# Patient Record
Sex: FEMALE | Race: WHITE | Employment: FULL TIME | URBAN - METROPOLITAN AREA
[De-identification: names, ages, dates, MRNs, and addresses within clinical notes are randomized per-mention and may not be internally consistent; named-entity substitution may affect disease eponyms.]

---

## 2022-09-01 ENCOUNTER — TELEPHONE (OUTPATIENT)
Dept: OBGYN CLINIC | Age: 37
End: 2022-09-01

## 2022-09-01 NOTE — TELEPHONE ENCOUNTER
Pt called she is a NP confirmation of pregnancy she has not been seen yet but she is having tooth pain and needs a tooth pulled but they will not see her until she gets a note and she is in a lot of pain she is wondering what you recommend please advise

## 2022-09-01 NOTE — TELEPHONE ENCOUNTER
Can write note and have either Dr. Johny Reyes or Dr. Josh Hoffman sign on Friday.     Thanks,    Phan Nunez, [address] [facility]   9/1/2022, 5:18 PM

## 2022-09-30 ENCOUNTER — OFFICE VISIT (OUTPATIENT)
Dept: OBGYN CLINIC | Age: 37
End: 2022-09-30
Payer: COMMERCIAL

## 2022-09-30 ENCOUNTER — HOSPITAL ENCOUNTER (OUTPATIENT)
Age: 37
Setting detail: SPECIMEN
Discharge: HOME | End: 2022-09-30

## 2022-09-30 VITALS
HEIGHT: 68 IN | WEIGHT: 160.6 LBS | SYSTOLIC BLOOD PRESSURE: 118 MMHG | BODY MASS INDEX: 24.34 KG/M2 | HEART RATE: 87 BPM | DIASTOLIC BLOOD PRESSURE: 2 MMHG

## 2022-09-30 DIAGNOSIS — N89.8 VAGINAL PRURITUS: ICD-10-CM

## 2022-09-30 DIAGNOSIS — B37.9 YEAST INFECTION: ICD-10-CM

## 2022-09-30 DIAGNOSIS — Z32.01 POSITIVE PREGNANCY TEST: Primary | ICD-10-CM

## 2022-09-30 DIAGNOSIS — O21.9 NAUSEA AND VOMITING DURING PREGNANCY: ICD-10-CM

## 2022-09-30 DIAGNOSIS — Z3A.09 9 WEEKS GESTATION OF PREGNANCY: ICD-10-CM

## 2022-09-30 DIAGNOSIS — Z32.01 POSITIVE PREGNANCY TEST: ICD-10-CM

## 2022-09-30 PROBLEM — K42.9 UMBILICAL HERNIA WITHOUT OBSTRUCTION AND WITHOUT GANGRENE: Status: ACTIVE | Noted: 2022-09-30

## 2022-09-30 PROBLEM — Z82.79 FAMILY HISTORY OF SPINA BIFIDA: Status: ACTIVE | Noted: 2022-09-30

## 2022-09-30 PROCEDURE — 99212 OFFICE O/P EST SF 10 MIN: CPT | Performed by: STUDENT IN AN ORGANIZED HEALTH CARE EDUCATION/TRAINING PROGRAM

## 2022-09-30 RX ORDER — AMOXICILLIN 500 MG/1
CAPSULE ORAL
COMMUNITY
Start: 2022-09-22 | End: 2022-10-28 | Stop reason: CLARIF

## 2022-09-30 RX ORDER — PYRIDOXINE HCL (VITAMIN B6) 25 MG
25 TABLET ORAL 3 TIMES DAILY
Qty: 60 TABLET | Refills: 3 | Status: SHIPPED | OUTPATIENT
Start: 2022-09-30 | End: 2022-11-02

## 2022-09-30 NOTE — PROGRESS NOTES
[facility]  Initial Prenatal Visit    CC: Initial Prenatal Visit    HPI:   Storm Mcgraw is a 30 y.o. female [id number] at 9w1d  She is being seen today for her first obstetrical visit. Her LMP was 22. She had her dating US this morning that confirmed cardiac activity and due date was confirmed to be 23. She is taking her prenatal vitamins. Has every day nausea. States it is hard for her to keep down food. She is not taking any meds for this nausea. States she is having vaginal pruritis that started a few days ago. Denies any vaginal bleeding but has an increase in discharge. She recently moved from [place] to [facility] in 2019. Denies any HTN or diabetes in any of her previous pregnancies. Reports she only tore with her first delivery. Denies any should dystocia hx or PPH. None of her kids had to go to the NICU. Her last baby she delivered at home [place] the [place]. States she has an umbilical hernia that always gets worse through out the pregnancy. She wants to have it surgically repaired when she is done having kids. Denies any pain related to the hernia today. G6 and G7 pregnancy are with the same FOB. Mother's ethnicity:   Father's ethnicity:   Family History:    - Neural tube defects: Yes: half sister with spina bifida - has had 2 surgeries and can walk    - Congenital birth defects (congenital heart defects, polydactyly, cleft lip/palate): No   - Intellectual disability: No   - Genetic disorders/chromosomal abnormalities: No   - Diabetes mellitus in first degree relatives: Yes: paternal grandfather     Genetic screening was discussed and patient desires NIPT but does not want to know gender .     OB History:  OB History    Para Term  AB Living   7 4 0 0 2 4   SAB IAB Ectopic Molar Multiple Live Births   2 0 0 0 0 4      # Outcome Date GA Lbr Kobi/2nd Weight Sex Delivery Anes PTL Lv   7 Current            6 Para 2019    F Vag-Spont   XIAO   5 SAB 2017           4 SAB 2017           3 Para 2015    M Vag-Spont   XIAO   2 Para 2009    M Vag-Spont   XIAO   1 Para 2007    F Vag-Spont   XIAO       Past Medical History:  History reviewed. No pertinent past medical history. Past Surgical History:  Past Surgical History:   Procedure Laterality Date    SHOULDER SURGERY Right 2017    dislocation        Medications:  Current Outpatient Medications on File Prior to Visit   Medication Sig Dispense Refill    amoxicillin (AMOXIL) 500 MG capsule       Prenatal Vit-Fe Fumarate-FA (PRENATAL PO) Take by mouth       No current facility-administered medications on file prior to visit. Allergies:   Allergies as of 09/30/2022    (No Known Allergies)       Social History:  Social History     Socioeconomic History    Marital status:      Spouse name: Not on file    Number of children: Not on file    Years of education: Not on file    Highest education level: Not on file   Occupational History    Not on file   Tobacco Use    Smoking status: Never    Smokeless tobacco: Never   Vaping Use    Vaping Use: Never used   Substance and Sexual Activity    Alcohol use: Not Currently    Drug use: Never    Sexual activity: Yes   Other Topics Concern    Not on file   Social History Narrative    Not on file     Social Determinants of Health     Financial Resource Strain: Not on file   Food Insecurity: Not on file   Transportation Needs: Not on file   Physical Activity: Not on file   Stress: Not on file   Social Connections: Not on file   Intimate Partner Violence: Not on file   Housing Stability: Not on file       Family History:  Family History   Problem Relation Age of Onset    No Known Problems Mother     No Known Problems Father     No Known Problems Maternal Grandmother     No Known Problems Maternal Grandfather     Breast Cancer Paternal Grandmother     Diabetes Paternal Grandfather        Vitals:  BP: (!) 118/2  Weight: 160 lb 9.6 oz (72.8 kg)  Heart Rate: 87     Physical Exam: Completed, See Epic Navigator      Assessment & Plan:  Miller Poole is a 30 y.o. female [id number] at 9w1d Initial Obstetrical Visit   - VSS    - Dating confirmed today    - Discussed with patient that this office does not participate in  water births but informed her of the labor tub in room 708. Patient was understanding and agreeable that she would deliver in the bed    - Discussed practicing diaphragmatic breathing and pelvic floor exercises to start incorporating into her daily routine to hopefully prevent worsening of her diastasis recti and umbilical hernia    - Prenatal labs ordered   - Prenatal vitamins QD   - VitB6 TID Rx sent for nausea     - Problem list reviewed and updated   - Desires NIPT for genetic testing but does not want to know the gender    - Patient needs referral to [facility] for anatomy scan    - Vaginitis and GC/C swabs collected     - Unsure of her last pap, collected today    - Follow up in 1 week for ACOG with Amy Robison    - Follow up in 4 weeks for next prenatal appointment     Upon completion of the visit all questions were answered and the patients follow-up and testing schedule were reviewed.      Patient Active Problem List    Diagnosis Date Noted    Family history of spina bifida 09/30/2022     Priority: Medium     Half sister- had surgery at birth and at 9 yrs old, is ambulatory and has children       Umbilical hernia 08/66/6892     Priority: [address], [address] [facility]   9/30/2022, 4:34 PM

## 2022-10-01 LAB
AMPHETAMINE SCREEN URINE: NEGATIVE
BACTERIA: ABNORMAL
BARBITURATE SCREEN URINE: NEGATIVE
BENZODIAZEPINE SCREEN, URINE: NEGATIVE
BILIRUBIN URINE: NEGATIVE
CANDIDA SPECIES, DNA PROBE: POSITIVE
CANNABINOID SCREEN URINE: NEGATIVE
COCAINE METABOLITE, URINE: NEGATIVE
COLOR: ABNORMAL
CRYSTALS, UA: ABNORMAL /HPF
CRYSTALS, UA: ABNORMAL /HPF
CULTURE: NORMAL
EPITHELIAL CELLS UA: ABNORMAL /HPF (ref 0–5)
FENTANYL URINE: NEGATIVE
GARDNERELLA VAGINALIS, DNA PROBE: NEGATIVE
GLUCOSE URINE: NEGATIVE
KETONES, URINE: ABNORMAL
LEUKOCYTE ESTERASE, URINE: ABNORMAL
METHADONE SCREEN, URINE: NEGATIVE
MUCUS: ABNORMAL
NITRITE, URINE: NEGATIVE
OPIATES, URINE: NEGATIVE
OXYCODONE SCREEN URINE: NEGATIVE
PH UA: 7 (ref 5–8)
PHENCYCLIDINE, URINE: NEGATIVE
PROTEIN UA: ABNORMAL
RBC UA: ABNORMAL /HPF (ref 0–2)
SOURCE: ABNORMAL
SPECIFIC GRAVITY UA: 1.03 (ref 1–1.03)
SPECIMEN DESCRIPTION: NORMAL
TEST INFORMATION: NORMAL
TRICHOMONAS VAGINALIS DNA: NEGATIVE
TURBIDITY: CLEAR
URINE HGB: NEGATIVE
UROBILINOGEN, URINE: NORMAL
WBC UA: ABNORMAL /HPF (ref 0–5)

## 2022-10-03 LAB
C TRACH DNA GENITAL QL NAA+PROBE: NEGATIVE
N. GONORRHOEAE DNA: NEGATIVE
SPECIMEN DESCRIPTION: NORMAL

## 2022-10-05 ENCOUNTER — TELEPHONE (OUTPATIENT)
Dept: OBGYN CLINIC | Age: 37
End: 2022-10-05

## 2022-10-05 NOTE — TELEPHONE ENCOUNTER
New OB LMOR she cancelled her ACOG visit with nurse. She needs to call back to R/S ACOG nurse before her next OB visit.

## 2022-10-14 LAB — CYTOLOGY REPORT: NORMAL

## 2022-10-28 ENCOUNTER — HOSPITAL ENCOUNTER (OUTPATIENT)
Age: 37
Setting detail: SPECIMEN
Discharge: HOME | End: 2022-10-28

## 2022-10-28 ENCOUNTER — ROUTINE PRENATAL (OUTPATIENT)
Dept: OBGYN CLINIC | Age: 37
End: 2022-10-28
Payer: COMMERCIAL

## 2022-10-28 VITALS
HEART RATE: 83 BPM | BODY MASS INDEX: 24.84 KG/M2 | DIASTOLIC BLOOD PRESSURE: 84 MMHG | SYSTOLIC BLOOD PRESSURE: 112 MMHG | WEIGHT: 163.4 LBS

## 2022-10-28 DIAGNOSIS — Z3A.13 13 WEEKS GESTATION OF PREGNANCY: ICD-10-CM

## 2022-10-28 DIAGNOSIS — Z34.92 NORMAL PREGNANCY IN SECOND TRIMESTER: ICD-10-CM

## 2022-10-28 DIAGNOSIS — Z82.79 FAMILY HISTORY OF SPINA BIFIDA: ICD-10-CM

## 2022-10-28 DIAGNOSIS — Z32.01 POSITIVE PREGNANCY TEST: ICD-10-CM

## 2022-10-28 DIAGNOSIS — Z3A.13 13 WEEKS GESTATION OF PREGNANCY: Primary | ICD-10-CM

## 2022-10-28 LAB
ABO/RH: NORMAL
ABSOLUTE EOS #: 0.25 K/UL (ref 0–0.44)
ABSOLUTE IMMATURE GRANULOCYTE: 0.06 K/UL (ref 0–0.3)
ABSOLUTE LYMPH #: 1 K/UL (ref 1.1–3.7)
ABSOLUTE MONO #: 0.37 K/UL (ref 0.1–1.2)
ANTIBODY SCREEN: NEGATIVE
BASOPHILS # BLD: 0 % (ref 0–2)
BASOPHILS ABSOLUTE: 0.03 K/UL (ref 0–0.2)
EOSINOPHILS RELATIVE PERCENT: 3 % (ref 1–4)
HCT VFR BLD CALC: 36 % (ref 36.3–47.1)
HEMOGLOBIN: 12.1 G/DL (ref 11.9–15.1)
HEPATITIS B SURFACE ANTIGEN: NONREACTIVE
HIV AG/AB: NONREACTIVE
IMMATURE GRANULOCYTES: 1 %
LYMPHOCYTES # BLD: 13 % (ref 24–43)
MCH RBC QN AUTO: 33.2 PG (ref 25.2–33.5)
MCHC RBC AUTO-ENTMCNC: 33.6 G/DL (ref 28.4–34.8)
MCV RBC AUTO: 98.6 FL (ref 82.6–102.9)
MONOCYTES # BLD: 5 % (ref 3–12)
NRBC AUTOMATED: 0 PER 100 WBC
PDW BLD-RTO: 12.3 % (ref 11.8–14.4)
PLATELET # BLD: 269 K/UL (ref 138–453)
PMV BLD AUTO: 10.7 FL (ref 8.1–13.5)
RBC # BLD: 3.65 M/UL (ref 3.95–5.11)
RUBV IGG SER QL: 72.2 IU/ML
SEG NEUTROPHILS: 78 % (ref 36–65)
SEGMENTED NEUTROPHILS ABSOLUTE COUNT: 6.05 K/UL (ref 1.5–8.1)
T. PALLIDUM, IGG: NONREACTIVE
WBC # BLD: 7.8 K/UL (ref 3.5–11.3)

## 2022-10-28 PROCEDURE — 1036F TOBACCO NON-USER: CPT | Performed by: STUDENT IN AN ORGANIZED HEALTH CARE EDUCATION/TRAINING PROGRAM

## 2022-10-28 PROCEDURE — G8420 CALC BMI NORM PARAMETERS: HCPCS | Performed by: STUDENT IN AN ORGANIZED HEALTH CARE EDUCATION/TRAINING PROGRAM

## 2022-10-28 PROCEDURE — G8427 DOCREV CUR MEDS BY ELIG CLIN: HCPCS | Performed by: STUDENT IN AN ORGANIZED HEALTH CARE EDUCATION/TRAINING PROGRAM

## 2022-10-28 PROCEDURE — 99213 OFFICE O/P EST LOW 20 MIN: CPT | Performed by: STUDENT IN AN ORGANIZED HEALTH CARE EDUCATION/TRAINING PROGRAM

## 2022-10-28 PROCEDURE — G8484 FLU IMMUNIZE NO ADMIN: HCPCS | Performed by: STUDENT IN AN ORGANIZED HEALTH CARE EDUCATION/TRAINING PROGRAM

## 2022-10-28 NOTE — PROGRESS NOTES
Prenatal Visit    Godwin Ho is a 30 y.o. female [id number] at 13w1d IUP    Subjective: The patient was seen and evaluated. She has no complaints today. Reports she feels so much better from her last appointment. She states she has not  completed her prenatal labs due to feeling so ill. She is now able to eat a normal diet. She denies contractions, changes in discharge, vaginal bleeding and leakage of fluid. She wants to find out the gender when she gets NIPT done. Is agreeable to AFP. Does not want flu shot this season      The problem list reflects the active issues addressed during today's visit    VITALS:    BP: 112/84  Weight: 163 lb 6.4 oz (74.1 kg)  Heart Rate: 83  Fetal HR: 158       Assessment & Plan:  Godwin Ho is a 30 y.o. female [id number] at 13w1d IUP   - VSS    - Initial prenatal labs ordered, Is going to get them completed today    - Still needs referral to [facility] to schedule anatomy scan    - NIPT ordered and she is getting completed today    - Influenza vaccination: R/B/A discussed and patient does not want    - COVID-19 vaccination: R/B/A discussed with increased risk of both maternal and fetal morbidity and mortality in unvaccinated pregnant patients who contract COVID-19- patient declined today   - AFP ordered for family hx   Return in about 4 weeks (around 11/25/2022) for Juani Haile 9038.     Patient Active Problem List    Diagnosis Date Noted    Family history of spina bifida 09/30/2022     Priority: Medium     Half sister- had surgery at birth and at 9 yrs old, is ambulatory and has children       Umbilical hernia 49/50/9032     Priority: [address], [address] [facility]  10/28/2022, 11:32 AM

## 2022-11-01 LAB
AFP INTERPRETATION: NORMAL
AFP MOM: NORMAL
AFP SPECIMEN: NORMAL
AFP: 12 NG/ML
DATE OF BIRTH: NORMAL
DATING METHOD: NORMAL
DETERMINED BY: NORMAL
DIABETIC: NO
DONOR EGG?: NORMAL
DUE DATE: NORMAL
ESTIMATED DUE DATE: NORMAL
FAMILY HISTORY NTD: NORMAL
GESTATIONAL AGE: NORMAL
IN VITRO FERTILIZATION: NORMAL
INSULIN REQ DIABETES: NO
LAST MENSTRUAL PERIOD: NORMAL
MATERNAL AGE AT EDD: 35.6 YR
MATERNAL WEIGHT: 163
MONOCHORIONIC TWINS: NORMAL
NUMBER OF FETUSES: NORMAL
PATIENT WEIGHT UNITS: NORMAL
PATIENT WEIGHT: NORMAL
RACE (MATERNAL): NORMAL
RACE: NORMAL
REPEAT SPECIMEN?: NO
SMOKING: NORMAL
SMOKING: NORMAL
VALPROIC/CARBAMAZEP: NORMAL
ZZ NTE CLEAN UP: HISTORY: YES

## 2022-11-02 ENCOUNTER — TELEMEDICINE (OUTPATIENT)
Dept: OBGYN CLINIC | Age: 37
End: 2022-11-02
Payer: COMMERCIAL

## 2022-11-02 DIAGNOSIS — O09.92 HIGH-RISK PREGNANCY IN SECOND TRIMESTER: ICD-10-CM

## 2022-11-02 DIAGNOSIS — Z3A.13 13 WEEKS GESTATION OF PREGNANCY: Primary | ICD-10-CM

## 2022-11-02 DIAGNOSIS — O09.522 AMA (ADVANCED MATERNAL AGE) MULTIGRAVIDA 35+, SECOND TRIMESTER: ICD-10-CM

## 2022-11-02 DIAGNOSIS — Z82.79 FH: SPINA BIFIDA: ICD-10-CM

## 2022-11-02 PROCEDURE — G8427 DOCREV CUR MEDS BY ELIG CLIN: HCPCS | Performed by: STUDENT IN AN ORGANIZED HEALTH CARE EDUCATION/TRAINING PROGRAM

## 2022-11-02 PROCEDURE — G8420 CALC BMI NORM PARAMETERS: HCPCS | Performed by: STUDENT IN AN ORGANIZED HEALTH CARE EDUCATION/TRAINING PROGRAM

## 2022-11-02 PROCEDURE — 99211 OFF/OP EST MAY X REQ PHY/QHP: CPT | Performed by: STUDENT IN AN ORGANIZED HEALTH CARE EDUCATION/TRAINING PROGRAM

## 2022-11-02 RX ORDER — LANOLIN ALCOHOL/MO/W.PET/CERES
25 CREAM (GRAM) TOPICAL 3 TIMES DAILY PRN
COMMUNITY

## 2022-11-02 NOTE — PROGRESS NOTES
Relationship with FOB: , living together, 3rd pregnancy together, 2 other children health  Partner's name: Sheila Crowell to [facility]  Pain Score:0/10  Job title:Full time Mom  This is a planned pregnancy:NO, using withdrawal method  Certain LMP:Yes  S/S of pregnancy:Yes, tested early failure on withdrawal, tired  Hx N/V pregnancy:N/V. Occasional emesis and nauseate at times. Mother's ethnicity:    Father's ethnicity:      -  Patient Active Problem List   Diagnosis    Family history of spina bifida    Umbilical hernia     Last menstrual period 07/28/2022. Jose Saldana is a 30 y.o. [id number], here for her ACOG. The patients past medical, surgical, social and family history were reviewed. Current medications and allergies were reviewed, and documented in the chart. Menstrual history: Regular  Birth control: IUD and BCP (mood swings and depression). Family planning, withdrawal and condoms. Wt Readings from Last 3 Encounters:   10/28/22 163 lb 6.4 oz (74.1 kg)   09/30/22 160 lb 9.6 oz (72.8 kg)     Recent Results (from the past 8736 hour(s))   GYN Cytology    Collection Time: 09/30/22  8:17 AM   Result Value Ref Range    Cytology Report       INTERPRETATION    Cervical material, (ThinPrep vial, Imaging-assisted review):  Specimen Adequacy:       Satisfactory for evaluation.       - Endocervical/transformation zone component present. Descriptive Diagnosis:       Negative for intraepithelial lesion or malignancy. Fungal organisms morphologically consistent with Candida species. Comments:       Specimen was screened at [address] [address]      Cytotechnologist:   ADRIANNA Boucher(ASCP)  **Electronically Signed Out**  lr/10/14/2022          Source:  A: Cervical material, (ThinPrep vial, Imaging-assisted review)    Clinical History  Pregnant: Z32.01  High Risk HPV DNA testing is requested if the diagnosis is ASC-US    GYNECOLOGIC CYTOLOGY REPORT    Patient Name: Hema Stark: [id number]  Path Number: [id number]  [facility] [address] [place], [address]  [phone]  Fax: [phone]     Vaginitis DNA Probe    Collection Time: 09/30/22 11:14 PM    Specimen: Vaginal   Result Value Ref Range    Source . VAGINAL SWAB     Trichomonas Vaginalis DNA NEGATIVE NEGATIVE    Gardnerella Vaginalis, DNA Probe NEGATIVE NEGATIVE    Candida Species, DNA Probe POSITIVE (A) NEGATIVE   C.trachomatis N.gonorrhoeae DNA    Collection Time: 09/30/22 11:15 PM    Specimen: Cervix   Result Value Ref Range    Specimen Description . CERVIX     C. trachomatis DNA NEGATIVE NEGATIVE    N. gonorrhoeae DNA NEGATIVE NEGATIVE   Urinalysis with Microscopic    Collection Time: 09/30/22 11:15 PM   Result Value Ref Range    Color, UA Dark Yellow (A) Yellow    Turbidity UA Clear Clear    Glucose, Ur NEGATIVE NEGATIVE    Bilirubin Urine NEGATIVE NEGATIVE    Ketones, Urine TRACE (A) NEGATIVE    Specific Gravity, UA 1.031 (H) 1.005 - 1.030    Urine Hgb NEGATIVE NEGATIVE    pH, UA 7.0 5.0 - 8.0    Protein, UA TRACE (A) NEGATIVE    Urobilinogen, Urine Normal Normal    Nitrite, Urine NEGATIVE NEGATIVE    Leukocyte Esterase, Urine TRACE (A) NEGATIVE    WBC, UA 0 TO 2 0 - 5 /HPF    RBC, UA 0 TO 2 0 - 2 /HPF    Crystals, UA FEW (A) None /HPF    Crystals, UA CALCIUM OXALATE (A) None /HPF    Epithelial Cells UA 10 TO 20 0 - 5 /HPF    Bacteria, UA MODERATE (A) None    Mucus, UA 2+ (A) None   Urine Drug Screen    Collection Time: 09/30/22 11:15 PM   Result Value Ref Range    Amphetamine Screen, Ur NEGATIVE NEGATIVE    Barbiturate Screen, Ur NEGATIVE NEGATIVE    Benzodiazepine Screen, Urine NEGATIVE NEGATIVE    Cocaine Metabolite, Urine NEGATIVE NEGATIVE    Methadone Screen, Urine NEGATIVE NEGATIVE    Opiates, Urine NEGATIVE NEGATIVE    Phencyclidine, Urine NEGATIVE NEGATIVE    Cannabinoid Scrn, Ur NEGATIVE NEGATIVE    Oxycodone Screen, Ur NEGATIVE NEGATIVE    Fentanyl, Ur NEGATIVE NEGATIVE    Test Information       Assay provides medical screening only. The absence of expected drug(s) and/or metabolite(s) may indicate diluted or adulterated urine, limitations of testing or timing of collection. Culture, Urine    Collection Time: 09/30/22 11:16 PM    Specimen: Urine, clean catch   Result Value Ref Range    Specimen Description . CLEAN CATCH URINE     Culture NO SIGNIFICANT GROWTH    HIV Screen    Collection Time: 10/28/22 11:45 AM   Result Value Ref Range    HIV Ag/Ab NONREACTIVE NONREACTIVE   Alpha Fetoprotein, Maternal    Collection Time: 10/28/22 11:45 AM   Result Value Ref Range    Date of Birth 1985     Maternal Weight 163     Patient Weight Units LBS     Due Date SEE NOTE     Determined by Other     Last Menstrual Period 07/28/2022     Monochorionic Twins INFORMATION NOT PROVIDED     Race (Maternal) WHITE     Diabetic NO     Smoking INFORMATION NOT PROVIDED     Valproic/Carbamazep INFORMATION NOT PROVIDED     Fam HX NTD FAMILY HISTORY OF SPINA BIFIDA     In Vitro Fertilization INFORMATION NOT PROVIDED     Donor Egg? INFORMATION NOT PROVIDED     Repeat Specimen? NO     AFP (Alpha Fetoprotein) 12 ng/mL    AFP Mom Not Done     AFP Interp See Note     M[age] NOEL [age] yr    Patient Weight 163.0 lbs.      Est Due Date 05/04/2023     Gestational Age 15 wks, 1 days     Dating Method LMP     Number of Fetuses [place]     Race Nonblack     Insulin Req Diabetes No     Smoking Unknown     History Yes     AFP Specimen See Note    PRENATAL TYPE AND SCREEN    Collection Time: 10/28/22 11:45 AM   Result Value Ref Range    ABO/Rh A POSITIVE     Antibody Screen NEGATIVE    Prenatal Profile I    Collection Time: 10/28/22 11:45 AM   Result Value Ref Range    WBC 7.8 3.5 - 11.3 k/uL    RBC 3.65 (L) 3.95 - 5.11 m/uL    Hemoglobin 12.1 11.9 - 15.1 g/dL    Hematocrit 36.0 (L) 36.3 - 47.1 %    MCV 98.6 82.6 - 102.9 fL    MCH 33.2 25.2 - 33.5 pg    MCHC 33.6 28.4 - 34.8 g/dL    RDW 12.3 11.8 - 14.4 %    Platelets 580 679 - 804 k/uL    MPV 10.7 8.1 - 13.5 fL    NRBC Automated 0.0 0.0 per 100 WBC    Seg Neutrophils 78 (H) 36 - 65 %    Lymphocytes 13 (L) 24 - 43 %    Monocytes 5 3 - 12 %    Eosinophils % 3 1 - 4 %    Basophils 0 0 - 2 %    Immature Granulocytes 1 (H) 0 %    Segs Absolute 6.05 1.50 - 8.10 k/uL    Absolute Lymph # 1.00 (L) 1.10 - 3.70 k/uL    Absolute Mono # 0.37 0.10 - 1.20 k/uL    Absolute Eos # 0.25 0.00 - 0.44 k/uL    Basophils Absolute 0.03 0.00 - 0.20 k/uL    Absolute Immature Granulocyte 0.06 0.00 - 0.30 k/uL    Hepatitis B Surface Ag NONREACTIVE NONREACTIVE    Rubella Antibody, IgG 72.2 IU/mL    T. pallidum, IgG NONREACTIVE NONREACTIVE       Past Medical History:   Diagnosis Date    Anemia     Migraine                                                                    Past Surgical History:   Procedure Laterality Date    SHOULDER SURGERY Right 2017    dislocation    WISDOM TOOTH EXTRACTION       Family History   Problem Relation Age of Onset    Heart Defect Mother         Hx murmur    Kidney stones Father     No Known Problems Sister     No Known Problems Maternal Grandmother     No Known Problems Maternal Grandfather     Breast Cancer Paternal Grandmother 79    Diabetes type 2  Paternal Grandfather [de-identified]    Nural Tube Defect Half-Sister         Spina bifida: 2 sugeries can walk    No Known Problems Half-Sister     No Known Problems Half-Brother     No Known Problems Half-Brother      Social History     Tobacco Use   Smoking Status Former    Types: Cigarettes    Quit date: 10/1/2012    Years since quitting: 10.0   Smokeless Tobacco Never     Social History     Substance and Sexual Activity   Alcohol Use Not Currently       MEDICATIONS:  Current Outpatient Medications   Medication Sig Dispense Refill    vitamin B-6 (PYRIDOXINE) 50 MG tablet Take 25 mg by mouth 3 times daily as needed      Prenatal Vit-Fe Fumarate-FA (PRENATAL PO) Take by mouth       No current facility-administered medications for this visit. ALLERGIES:  Patient has no known allergies. Reviewed global and practice OB care including nausea measures, nutrition, activities, warning signs, and contact information. Offered cell free DNA screen,NT echo and WIC .    `--------------------------------------------------------------------------  Genetic Screening/Teratology Counseling  (Include patient, FOB or anyone in either family)    1) Patient's age 28 years or > at NOEL: Yes Pt 30.   2) Thalassemia (Mediterranean, ): No  3) Neural Tube Defect:   Yes pt has half sister spina bifida. 4) Congenital heart defect:   Yes Pt mother was Dx with Murmur. 5) Trisomy (e.g. Down Syndrome):  No  6) Ry[facility]achs [facility] No  7) Multiple Births:    No  8) Sickle cell (disease or trait):  No  9) Hemophilia or blood disorders:  No  10) Muscular Dystrophy:   No  11) Cystic Fibrosis:    No  12) Keith's chorea:   No  13) Mental retardation/Autism :  No   If yes, was person tested for fragile X: No  14) Other inherited genetic/chromosomal disorder: No  15) Maternal metabolic disorder (DM, PKU): No  16) Child with birth defect not listed:  No  17) Recurrent pregnancy loss/stillbirth: No  18) Medications, supplements/illicit or   Recreational drugs/alcohol since LMP: No   List: none  19) Any other:   none    Comments/Counseling: VV-tele visit; ACOG with nurse. -POC NIPT drawn last week at OB/Gyn office  -POC pt declines genetic testing or consult at [facility]   -pt states she has a known Umb hernia that only effects her when pregnant. Pt wanting to do pelvic floor exercises after delivery.   -------------------------------------------------------------------------  Infection History:    1) Live with someone with TB/exposed to TB: No  2) Patient/partner has h/o genital herpes: No  3) Rash/viral illness since LMP:  No  4) History of STD:    No  5) Other: No  -------------------------------------------------------------------------     Check list reviewed with New OB patient:    [facility]  -Delivery only at [address]  -Several providers in practice and only doctors do deliveries  -May reach practice via [address] or phone. Honey messages are only answered M-Fri when office is open. Testing  -Genetic testing (NIPT, AFP and/or referral to [facility])  -Testing per ACOG guidelines that are needed for any pregnancy  -Testing may be added according to your needs or if you become high risk  -Normal pregnancy US, one dating and one 20 week anatomy scan  -referral to [facility] each patient 20 week Ultrasound only    Appts:  -q 4 wks until your 28 wks  -@ 28wk q2wks  -@ 36wks q week  -this changes if you have increased risk factors    Diet:  -Nothing unpasteurized  -Lunch meats need to be steamed or heated  -Meats need to be thoroughly cooked, nothing pink or bldg  -Caffeine MAX per ACOG 16 oz/per day  -Artifical sweetener, limit no confirmed abnormal findings with development of fetus   -Fish, detailed list provided in OB packet, avoid large fish and only so many oz per week  -If you are vegan or vegetarian. OB pt needs 60 gm protein per day. -Caution with dehydration may cause cramping.      Exercise,Traveling and safety  -No sit ups after 14 weeks  -HR needs to be <160  -No heavy squatting  -nothing where you can fall and hurt yourself  (your center of gravity is not same when pregnant)  -Ask your provider if it's safe for you to fly  -long travel need to get up and walk around after 2 hours  -wear seat belt below gravid abd  -good support socks while traveling to aid with circulation    Advise  -Review need for vaccines in pregnancy COVID, FLU and T-dap,   -No swimming in natural bodies of water, lakes, ponds or oceans  -No sauna's or hot tubs   -Bug spray, nothing with Deet in it  -Seeing dentist once per pregnancy, any infection can cause  labor, will need note for dentist

## 2022-11-07 ENCOUNTER — TELEPHONE (OUTPATIENT)
Dept: OBGYN CLINIC | Age: 37
End: 2022-11-07

## 2022-11-07 DIAGNOSIS — R51.9 PREGNANCY HEADACHE, ANTEPARTUM: Primary | ICD-10-CM

## 2022-11-07 DIAGNOSIS — Z3A.13 13 WEEKS GESTATION OF PREGNANCY: ICD-10-CM

## 2022-11-07 DIAGNOSIS — O26.899 PREGNANCY HEADACHE, ANTEPARTUM: Primary | ICD-10-CM

## 2022-11-07 DIAGNOSIS — Z82.79 FAMILY HISTORY OF SPINA BIFIDA: ICD-10-CM

## 2022-11-07 DIAGNOSIS — Z34.92 NORMAL PREGNANCY IN SECOND TRIMESTER: ICD-10-CM

## 2022-11-07 RX ORDER — CALCIUM CARBONATE/VITAMIN D3 500-10/5ML
1 LIQUID (ML) ORAL NIGHTLY
Qty: 90 CAPSULE | Refills: 1 | Status: SHIPPED | OUTPATIENT
Start: 2022-11-07

## 2022-11-07 NOTE — TELEPHONE ENCOUNTER
Pt was called with NIPT results   Also was c/o headaches with pregnancy   At least once a week   Has tried Tylenol (which helped but has heard rumors about Tylenol possibly being unsafe in pregnancy), increasing water, smelling peppermint oil, Vicks, janel   Could she try some magnesium & if so can we send a rx

## 2022-11-23 ENCOUNTER — ROUTINE PRENATAL (OUTPATIENT)
Dept: OBGYN CLINIC | Age: 37
End: 2022-11-23
Payer: COMMERCIAL

## 2022-11-23 VITALS
WEIGHT: 165 LBS | DIASTOLIC BLOOD PRESSURE: 59 MMHG | HEART RATE: 78 BPM | SYSTOLIC BLOOD PRESSURE: 104 MMHG | BODY MASS INDEX: 25.09 KG/M2

## 2022-11-23 DIAGNOSIS — Z3A.16 16 WEEKS GESTATION OF PREGNANCY: Primary | ICD-10-CM

## 2022-11-23 DIAGNOSIS — Z82.79 FAMILY HISTORY OF SPINA BIFIDA: ICD-10-CM

## 2022-11-23 DIAGNOSIS — O09.92 SUPERVISION OF HIGH RISK PREGNANCY IN SECOND TRIMESTER: ICD-10-CM

## 2022-11-23 DIAGNOSIS — O09.529 ANTEPARTUM MULTIGRAVIDA OF ADVANCED MATERNAL AGE: ICD-10-CM

## 2022-11-23 PROCEDURE — 99213 OFFICE O/P EST LOW 20 MIN: CPT | Performed by: STUDENT IN AN ORGANIZED HEALTH CARE EDUCATION/TRAINING PROGRAM

## 2022-11-23 PROCEDURE — G8419 CALC BMI OUT NRM PARAM NOF/U: HCPCS | Performed by: STUDENT IN AN ORGANIZED HEALTH CARE EDUCATION/TRAINING PROGRAM

## 2022-11-23 PROCEDURE — G8427 DOCREV CUR MEDS BY ELIG CLIN: HCPCS | Performed by: STUDENT IN AN ORGANIZED HEALTH CARE EDUCATION/TRAINING PROGRAM

## 2022-11-23 PROCEDURE — G8484 FLU IMMUNIZE NO ADMIN: HCPCS | Performed by: STUDENT IN AN ORGANIZED HEALTH CARE EDUCATION/TRAINING PROGRAM

## 2022-11-23 PROCEDURE — 1036F TOBACCO NON-USER: CPT | Performed by: STUDENT IN AN ORGANIZED HEALTH CARE EDUCATION/TRAINING PROGRAM

## 2022-11-23 RX ORDER — ASPIRIN 81 MG/1
81 TABLET ORAL DAILY
Qty: 90 TABLET | Refills: 1 | Status: SHIPPED | OUTPATIENT
Start: 2022-11-23

## 2022-11-23 NOTE — PROGRESS NOTES
Prenatal Visit    Tahir Cardenas is a 30 y.o. female D3T7138 at 16w6d    Subjective: The patient was seen and evaluated. Reports Positive fetal movements. She denies abdominal pain, vaginal bleeding and leakage of fluid. Signs and symptoms of  labor as well as labor were reviewed. Dates were reviewed with the patient. Estimated Date of Delivery: 23          The patient declined the influenza vaccine this year. The problem list reflects the active issues addressed during today's visit    VITALS:    BP: (!) 104/59  Weight: 165 lb (74.8 kg)  Heart Rate: 78  Patient Position: Sitting  Fetal HR: 141  Movement: Present       Assessment & Plan:  Tahir Cardenas is a 30 y.o. female [id number] at 16w7d   - An 18-22 week anatomy ultrasound has been ordered 22   - NIPT pending   - MSAFP was ordered. - The ACIP recommended pregnant patients be included in phase 1C of vaccine distribution. This decision is supported by [facility] and ACOG. As of 2021, there have been over 30,000 pregnant patients included in the V-safe post COVID vaccination safety . Most (73%) reports to VAERS among pregnant women involved non-pregnancyspecific adverse events (e.g., local and systemic reactions). Miscarriage was the most frequently reported pregnancy-specific adverse event to VAERS; numbers are within the known background rates based on presumed COVID-19 vaccine doses administered to pregnant women. No unexpected pregnancy or infant outcomes have been observed related to  COVID-19 vaccination during pregnancy.  Recommended patient proceed with vaccination.    - Baby ASA sent to pharmacy on file      Patient Active Problem List    Diagnosis Date Noted    [facility] 2022     Priority: Medium     On ASA 81 mg      Umbilical hernia      Priority: Medium    Family history of spina bifida 2022     Half sister- had surgery at birth and at 9 yrs old, is ambulatory and has children        Return in about 4

## 2022-12-21 ENCOUNTER — ROUTINE PRENATAL (OUTPATIENT)
Dept: PERINATAL CARE | Age: 37
End: 2022-12-21
Payer: COMMERCIAL

## 2022-12-21 ENCOUNTER — HOSPITAL ENCOUNTER (OUTPATIENT)
Age: 37
Discharge: HOME | End: 2022-12-21
Payer: COMMERCIAL

## 2022-12-21 VITALS
SYSTOLIC BLOOD PRESSURE: 113 MMHG | WEIGHT: 168 LBS | DIASTOLIC BLOOD PRESSURE: 68 MMHG | TEMPERATURE: 97.2 F | BODY MASS INDEX: 25.46 KG/M2 | HEIGHT: 68 IN | HEART RATE: 84 BPM | RESPIRATION RATE: 16 BRPM

## 2022-12-21 DIAGNOSIS — Z3A.20 20 WEEKS GESTATION OF PREGNANCY: ICD-10-CM

## 2022-12-21 DIAGNOSIS — O09.292 HISTORY OF INTRAUTERINE GROWTH RESTRICTION IN PRIOR PREGNANCY, CURRENTLY PREGNANT, SECOND TRIMESTER: ICD-10-CM

## 2022-12-21 DIAGNOSIS — O44.00 PLACENTA PREVIA WITHOUT HEMORRHAGE, ANTEPARTUM: ICD-10-CM

## 2022-12-21 DIAGNOSIS — Z36.86 ENCOUNTER FOR SCREENING FOR RISK OF PRE-TERM LABOR: ICD-10-CM

## 2022-12-21 DIAGNOSIS — O09.522 MULTIGRAVIDA OF ADVANCED MATERNAL AGE IN SECOND TRIMESTER: Primary | ICD-10-CM

## 2022-12-21 DIAGNOSIS — Z82.49 FAMILY HISTORY OF THROMBOEMBOLIC DISEASE: ICD-10-CM

## 2022-12-21 DIAGNOSIS — O35.2XX0 HEREDITARY FAMILIAL DISEASE AFFECTING MANAGEMENT OF MOTHER AND POSSIBLY AFFECTING FETUS, ANTEPARTUM, SINGLE OR UNSPECIFIED FETUS: ICD-10-CM

## 2022-12-21 LAB
HEPARIN LOW MOLECULAR WEIGHT: 0.01 IU/ML
HOMOCYSTEINE: 5.6 UMOL/L

## 2022-12-21 PROCEDURE — 36415 COLL VENOUS BLD VENIPUNCTURE: CPT

## 2022-12-21 PROCEDURE — 85520 HEPARIN ASSAY: CPT

## 2022-12-21 PROCEDURE — 85305 CLOT INHIBIT PROT S TOTAL: CPT

## 2022-12-21 PROCEDURE — 85303 CLOT INHIBIT PROT C ACTIVITY: CPT

## 2022-12-21 PROCEDURE — 82105 ALPHA-FETOPROTEIN SERUM: CPT

## 2022-12-21 PROCEDURE — G8419 CALC BMI OUT NRM PARAM NOF/U: HCPCS | Performed by: OBSTETRICS & GYNECOLOGY

## 2022-12-21 PROCEDURE — 85300 ANTITHROMBIN III ACTIVITY: CPT

## 2022-12-21 PROCEDURE — 86147 CARDIOLIPIN ANTIBODY EA IG: CPT

## 2022-12-21 PROCEDURE — G8484 FLU IMMUNIZE NO ADMIN: HCPCS | Performed by: OBSTETRICS & GYNECOLOGY

## 2022-12-21 PROCEDURE — 81291 MTHFR GENE: CPT

## 2022-12-21 PROCEDURE — 76817 TRANSVAGINAL US OBSTETRIC: CPT | Performed by: OBSTETRICS & GYNECOLOGY

## 2022-12-21 PROCEDURE — 81240 F2 GENE: CPT

## 2022-12-21 PROCEDURE — 85306 CLOT INHIBIT PROT S FREE: CPT

## 2022-12-21 PROCEDURE — 99243 OFF/OP CNSLTJ NEW/EST LOW 30: CPT | Performed by: OBSTETRICS & GYNECOLOGY

## 2022-12-21 PROCEDURE — 85301 ANTITHROMBIN III ANTIGEN: CPT

## 2022-12-21 PROCEDURE — 76811 OB US DETAILED SNGL FETUS: CPT | Performed by: OBSTETRICS & GYNECOLOGY

## 2022-12-21 PROCEDURE — 83090 ASSAY OF HOMOCYSTEINE: CPT

## 2022-12-21 PROCEDURE — G8427 DOCREV CUR MEDS BY ELIG CLIN: HCPCS | Performed by: OBSTETRICS & GYNECOLOGY

## 2022-12-21 PROCEDURE — 85302 CLOT INHIBIT PROT C ANTIGEN: CPT

## 2022-12-21 PROCEDURE — 81241 F5 GENE: CPT

## 2022-12-22 LAB
ABDOMINAL CIRCUMFERENCE: NORMAL
ABDOMINAL CIRCUMFERENCE: NORMAL
BIPARIETAL DIAMETER: NORMAL
BIPARIETAL DIAMETER: NORMAL
ESTIMATED FETAL WEIGHT: NORMAL
ESTIMATED FETAL WEIGHT: NORMAL
FEMORAL DIAMETER: NORMAL
FEMORAL DIAMETER: NORMAL
HC/AC: NORMAL
HC/AC: NORMAL
HEAD CIRCUMFERENCE: NORMAL
HEAD CIRCUMFERENCE: NORMAL

## 2022-12-23 ENCOUNTER — TELEPHONE (OUTPATIENT)
Dept: OBGYN CLINIC | Age: 37
End: 2022-12-23

## 2022-12-23 LAB
AFP INTERPRETATION: NORMAL
AFP MOM: 0.48
AFP SPECIMEN: NORMAL
AFP: 30 NG/ML
ANTI-THROMBIN 3 AG: 85 % (ref 82–136)
ANTICARDIOLIPIN IGA ANTIBODY: 1.8 APL (ref 0–14)
ANTICARDIOLIPIN IGG ANTIBODY: 1.5 GPL (ref 0–10)
CARDIOLIPIN AB IGM: 2.5 MPL (ref 0–10)
DATE OF BIRTH: NORMAL
DATING METHOD: NORMAL
DETERMINED BY: NORMAL
DIABETIC: NEGATIVE
DONOR EGG?: NORMAL
DUE DATE: NORMAL
ESTIMATED DUE DATE: NORMAL
FAMILY HISTORY NTD: NEGATIVE
GESTATIONAL AGE: NORMAL
IN VITRO FERTILIZATION: NORMAL
INSULIN REQ DIABETES: NO
LAST MENSTRUAL PERIOD: NORMAL
MATERNAL AGE AT EDD: 35.6 YR
MATERNAL WEIGHT: 168
MONOCHORIONIC TWINS: NORMAL
NUMBER OF FETUSES: NORMAL
PATIENT WEIGHT UNITS: NORMAL
PATIENT WEIGHT: NORMAL
PROTEIN C ANTIGEN: 91 % (ref 63–153)
PROTEIN S ANTIGEN, TOTAL: 70 % (ref 63–126)
RACE (MATERNAL): NORMAL
RACE: NORMAL
REPEAT SPECIMEN?: NORMAL
SMOKING: NORMAL
SMOKING: NORMAL
VALPROIC/CARBAMAZEP: NORMAL
ZZ NTE CLEAN UP: HISTORY: NO

## 2022-12-23 NOTE — TELEPHONE ENCOUNTER
Pt called she is 21w1d she has a cold wondering what she can take I let pt know she can take tylenol tylenol cold and flu and plain robitussin has appt Tuesday

## 2022-12-25 LAB
MTHFR INTERPRETATION: NORMAL
MTHFR MUTATION A1286C: NEGATIVE
MTHFR MUTATION C665T: NEGATIVE
PROTHROMBIN G20210A MUTATION: NEGATIVE
PT PCR SPECIMEN: NORMAL
SPECIMEN: NORMAL

## 2022-12-27 ENCOUNTER — TELEPHONE (OUTPATIENT)
Dept: OBGYN CLINIC | Age: 37
End: 2022-12-27

## 2022-12-27 NOTE — TELEPHONE ENCOUNTER
Pt called she would like to get your opinion on her [address] she had done at [place] [address] office please advise

## 2022-12-29 ENCOUNTER — NURSE TRIAGE (OUTPATIENT)
Dept: OTHER | Age: 37
End: 2022-12-29

## 2022-12-29 ENCOUNTER — HOSPITAL ENCOUNTER (OUTPATIENT)
Age: 37
Discharge: HOME | End: 2022-12-29
Attending: OBSTETRICS & GYNECOLOGY | Admitting: OBSTETRICS & GYNECOLOGY
Payer: COMMERCIAL

## 2022-12-29 VITALS
OXYGEN SATURATION: 96 % | TEMPERATURE: 98 F | RESPIRATION RATE: 17 BRPM | HEART RATE: 66 BPM | SYSTOLIC BLOOD PRESSURE: 115 MMHG | DIASTOLIC BLOOD PRESSURE: 65 MMHG

## 2022-12-29 PROBLEM — O44.00 PLACENTA PREVIA: Status: ACTIVE | Noted: 2022-12-29

## 2022-12-29 PROBLEM — O36.5990 IUGR (INTRAUTERINE GROWTH RESTRICTION) AFFECTING CARE OF MOTHER: Status: ACTIVE | Noted: 2022-12-29

## 2022-12-29 PROBLEM — O09.92 HRP (HIGH RISK PREGNANCY), SECOND TRIMESTER: Status: ACTIVE | Noted: 2022-12-29

## 2022-12-29 LAB
BACTERIA: ABNORMAL
BILIRUBIN URINE: NEGATIVE
CANDIDA SPECIES, DNA PROBE: POSITIVE
CASTS UA: ABNORMAL /LPF (ref 0–8)
COLOR: YELLOW
EPITHELIAL CELLS UA: ABNORMAL /HPF (ref 0–5)
GARDNERELLA VAGINALIS, DNA PROBE: NEGATIVE
GLUCOSE URINE: NEGATIVE
KETONES, URINE: NEGATIVE
LEUKOCYTE ESTERASE, URINE: ABNORMAL
MUCUS: ABNORMAL
NITRITE, URINE: NEGATIVE
PH UA: 6.5 (ref 5–8)
PROTEIN UA: NEGATIVE
RBC UA: ABNORMAL /HPF (ref 0–4)
SOURCE: ABNORMAL
SPECIFIC GRAVITY UA: 1.02 (ref 1–1.03)
TRICHOMONAS VAGINALIS DNA: NEGATIVE
TURBIDITY: ABNORMAL
URINE HGB: NEGATIVE
UROBILINOGEN, URINE: NORMAL
WBC UA: ABNORMAL /HPF (ref 0–5)

## 2022-12-29 PROCEDURE — 87510 GARDNER VAG DNA DIR PROBE: CPT

## 2022-12-29 PROCEDURE — 87086 URINE CULTURE/COLONY COUNT: CPT

## 2022-12-29 PROCEDURE — 6370000000 HC RX 637 (ALT 250 FOR IP)

## 2022-12-29 PROCEDURE — 99213 OFFICE O/P EST LOW 20 MIN: CPT

## 2022-12-29 PROCEDURE — 99236 HOSP IP/OBS SAME DATE HI 85: CPT | Performed by: OBSTETRICS & GYNECOLOGY

## 2022-12-29 PROCEDURE — 87480 CANDIDA DNA DIR PROBE: CPT

## 2022-12-29 PROCEDURE — 87491 CHLMYD TRACH DNA AMP PROBE: CPT

## 2022-12-29 PROCEDURE — 87660 TRICHOMONAS VAGIN DIR PROBE: CPT

## 2022-12-29 PROCEDURE — 81001 URINALYSIS AUTO W/SCOPE: CPT

## 2022-12-29 PROCEDURE — 87591 N.GONORRHOEAE DNA AMP PROB: CPT

## 2022-12-29 RX ORDER — CEPHALEXIN 500 MG/1
500 CAPSULE ORAL 4 TIMES DAILY
Qty: 28 CAPSULE | Refills: 0 | Status: SHIPPED | OUTPATIENT
Start: 2022-12-29 | End: 2023-01-05

## 2022-12-29 RX ORDER — ONDANSETRON 4 MG/1
4 TABLET, ORALLY DISINTEGRATING ORAL EVERY 8 HOURS PRN
Status: DISCONTINUED | OUTPATIENT
Start: 2022-12-29 | End: 2022-12-29 | Stop reason: HOSPADM

## 2022-12-29 RX ORDER — ACETAMINOPHEN 500 MG
1000 TABLET ORAL EVERY 6 HOURS PRN
Status: DISCONTINUED | OUTPATIENT
Start: 2022-12-29 | End: 2022-12-29 | Stop reason: HOSPADM

## 2022-12-29 RX ORDER — ONDANSETRON 2 MG/ML
4 INJECTION INTRAMUSCULAR; INTRAVENOUS EVERY 6 HOURS PRN
Status: DISCONTINUED | OUTPATIENT
Start: 2022-12-29 | End: 2022-12-29 | Stop reason: HOSPADM

## 2022-12-29 RX ADMIN — FLUCONAZOLE 150 MG: 50 TABLET ORAL at 11:18

## 2022-12-29 NOTE — H&P
OBSTETRICAL HISTORY [facility]    Date: 2022       Time: 6:38 PM   Patient Name: Shannon Rivera     Patient : 1985  Room/Bed: [place] [address]    Admission Date/Time: 2022  7:23 AM      CC: contractions/cramping     HPI: Shannon Rivera is a 30 y.o. [id number] at 22w0d who presents with complints of contractions from this morning. Patient denies any fever, chills, N/V, headaches, vision changes, chest pain, shortness of breath, RUQ pain, abdominal pain, and increased swelling/tenderness in bilateral lower extremities. Patient denies any vaginal discharge and any urinary complaints. The patient reports fetal movement is present, complains of contractions, denies loss of fluid, denies vaginal bleeding. DATING:  LMP: Patient's last menstrual period was 2022.   Estimated Date of Delivery: 23   Based on: LMP c/w early ultrasound at 9 weeks 2 days gestation    PREGNANCY RISK FACTORS:  Patient Active Problem List   Diagnosis    Family history of spina bifida    Umbilical hernia    AMA    FHx VTE    HRP (high risk pregnancy), second trimester    Hx IUGR    Complete Placenta Previa        Steroids Given In This Pregnancy:  no     REVIEW OF SYSTEMS:  Constitutional: negative fever, chills  HEENT: negative headaches, visual disturbances  Respiratory: negative dyspnea, cough, wheezing  Cardiovascular: negative chest pain, palpitations  Gastrointestinal: negative abdominal pain, RUQ pain, N/V, diarrhea, constipation  Genitourinary: negative dysuria, frequency, hesitancy, hematuria, changes in vaginal discharge  Dermatological: negative rash  Hematologic: negative bruising  Immunologic/Lymphatic: negative recent illness, recent sick contact, LE swelling   Musculoskeletal: negative back pain, myalgias, arthralgias  Neurological: negative dizziness, weakness, loss of sensation, tingling  Behavior/Psych: negative depression, anxiety  Obstetrics: positive fetal movement, negative LOF, negative vaginal bleeding, negative contractions, negative pelvic cramping      OBSTETRICAL HISTORY:   OB History    Para Term  AB Living   7 4 4 0 2 4   SAB IAB Ectopic Molar Multiple Live Births   2 0 0 0 0 4      # Outcome Date GA Lbr Kobi/2nd Weight Sex Delivery Anes PTL Lv   7 Current            6 Term 2019    F Vag-Spont   XIAO   5 SAB 2017           4 SAB 2017           3 Term 2015    M Vag-Spont EPI  XIAO   2 Term     M Vag-Spont EPI  XIAO   1 Term     F Vag-Spont EPI  XIAO      Obstetric Comments   G6 and G7 same FOB       PAST MEDICAL HISTORY:   has a past medical history of Anemia and Umbilical hernia. PAST SURGICAL HISTORY:   has a past surgical history that includes shoulder surgery (Right, 2017) and [place] tooth extraction. ALLERGIES:  has No Known Allergies. MEDICATIONS:  Prior to Admission medications    Medication Sig Start Date End Date Taking? Authorizing Provider   cephALEXin (KEFLEX) 500 MG capsule Take 1 capsule by mouth 4 times daily for 7 days 22 Yes Angelica Warren MD   aspirin EC 81 MG EC tablet Take 1 tablet by mouth daily 22   Jacksonwald Bras, DO   Magnesium Oxide 400 MG CAPS Take 1 tablet by mouth nightly Can take additional dose during day if headache starts and continue nightly.  22   Jacksonwaldedmundo Hernandes DO   vitamin B-6 (PYRIDOXINE) 50 MG tablet Take 25 mg by mouth 3 times daily as needed    Historical Provider, MD   Prenatal Vit-Fe Fumarate-FA (PRENATAL PO) Take by mouth    Historical Provider, MD       FAMILY HISTORY:  family history includes Breast Cancer (age of onset: 79) in her paternal grandmother; Diabetes type 2  (age of onset: [de-identified]) in her paternal grandfather; Heart Defect in her mother; Kidney stones in her father; No Known Problems in her half-brother, half-brother, half-sister, maternal grandfather, maternal grandmother, and sister; Nural Tube Defect in her half-sister; Obesity in her mother. SOCIAL HISTORY:   reports that she quit smoking about 10 years ago. Her smoking use included cigarettes. She has never used smokeless tobacco. She reports that she does not currently use alcohol. She reports that she does not use drugs. VITALS:  Vitals:    12/29/22 0742   BP: 115/65   Pulse: 66   Resp: 17   Temp: 98 °F (36.7 °C)   TempSrc: Oral   SpO2: 96%         PHYSICAL EXAM:  Fetal Heart Monitor:  Baseline Heart Rate 140, moderate variability, present accelerations, absent decelerations  [place]: uterine irritability     General appearance:  awake, alert, cooperative, no apparent distress, and appears stated age  HEENT: head atraumatic, normocephalic, moist mucous membranes, trachea midline  Neurologic:  alert, oriented, normal speech, no focal findings or movement disorder noted  Lungs:  No increased work of breathing, good air exchange, clear to auscultation bilaterally, no crackles or wheezing  Heart:  Normal apical impulse, regular rate and rhythm, normal S1 and S2, no S3 or S4, and no murmur noted    Abdomen:  gravid, non-tender, and no rebound, guarding, or rigidity  Extremities:  no calf tenderness,  edematous  Musculoskeletal: Gross strength equal and intact throughout, no gross abnormalities  Psychiatric: Mood appropriate, normal affect   Rectal Exam: not indicated  Sterile Speculum Exam:   Urethral meatus: normal appearing   Vulva: Normal hair distribution, normal appearing vulva, no masses, tenderness or lesions, normal clitoris   Vagina: Normal appearing vaginal mucosa without lesions, white vaginal discharge noted in the posterior vault, no lacerations   Cervix: Normal appearing cervix without lesions, no lacerations or abnormal lesions visualized    PRENATAL LAB RESULTS:   Blood Type/Rh: A pos  Antibody Screen: negative  Hemoglobin, Hematocrit, Platelets: 17.1 / 71.6 / 269  Rubella: immune  T.  Pallidum, IgG: non-reactive   Hepatitis B Surface Antigen: non-reactive   Hepatitis C Antibody: not done  HIV: non-reactive   Sickle Cell Screen: not done  Gonorrhea: negative  Chlamydia: negative  Urine culture: negative, 9/30/22    1 hour Glucose Tolerance Test: Not yet done      Group B Strep: not done  Cystic Fibrosis Screen: not available  First Trimester Screen: not available  MSAFP/Multiple Markers: normal  Non-Invasive Prenatal Testing: no aneuploidy detected  Anatomy US: posterior placenta with complete previa, 3 vc with normal insertion, normal female anatomy        ASSESSMENT & PLAN:  Radha Jade is a 30 y.o. female [id number] at 22w0d IUP who presents with contractions    - Rh positive / R immune / GBS unknown   - No indication for GBS prophylaxis at this time   - VSS, afebrile   - cEFM/TOCO: cat 1 with uterine irritability   - Vaginitis, GCC and UA collected   - Vaginitis + for candida and UA suspicious for a UTI   - GCC pending   - Patient at this time is unlikely to be in labor.    - Patient reassured  For all patients over 28 weeks gestation, Kick sheet parameters every 8 hours were reviewed and recommended. All questions answered. Patient vocalized understanding. Patient is aware that she should return to the hospital if she has worsening contractions, LOF, VB or decreased fetal movements. She voices understanding. Patient is aware that she should return to hospital if she experiences unrelenting headache, vision changes, RUQ pain, nausea, vomiting, and peripheral edema. She voices understanding.     Placenta Previa   - Patient aware of diagnosis and need to schedule a c/s between 36w0d to 37w6d   - Patient counseled on the importance of refraining from sexual intercourse, putting things in vagina   - Patient endorses understanding    Fhx Spina Bifida   - In patient's half sister   - AFP wnl    AMA   - NIPT negative    Fhx VTE   - In patient's mom   - Thrombophilia workup negative    Hx IUGR   - Patient follows closely with MFM   - No FGR in this gestation    BMI 25        Patient Active Problem List    Diagnosis Date Noted    HRP (high risk pregnancy), second trimester 12/29/2022     Priority: Medium    Hx IUGR 12/29/2022     Priority: Medium    Complete Placenta Previa 12/29/2022     Priority: Medium     Noted on MFM scan 12/21/22      [facility] 11/23/2022     Priority: Medium     On ASA 81 mg      Umbilical hernia 05/90/4497     Priority: Medium    FHx VTE 12/21/2022     DVT - mother   MFM ordered thrombophilia w/u      Family history of spina bifida 09/30/2022     Half sister- had surgery at birth and at 9 yrs old, is ambulatory and has children          Plan discussed with Dr. Jax Ledbetter, who is agreeable. Steroids given this admission: No    Risks, benefits, alternatives and possible complications have been discussed in detail with the patient. Admission, and post admission procedures and expectations were discussed in detail. All questions were answered.     Attending's Name: Dr. Bessie Iraheta MD  Ob/Gyn Resident  [facility]  12/29/2022, 6:38 PM

## 2022-12-29 NOTE — FLOWSHEET NOTE
Pt to triage from ED. States that she has contraction like pain every 5-20minutes. Denies LOF. Denies bleeding. Monitors applied. Dr Ortiz Monday aware.

## 2022-12-29 NOTE — FLOWSHEET NOTE
Dr. Farhat Sorensen in to discharge pt. Discharge education provided at this time. Pt states understanding.  No questions noted

## 2022-12-29 NOTE — FLOWSHEET NOTE
Dr Christine Stewart in room, discusses plan of care. Sve for cultures. Okay to leave efm US off due to gestation.

## 2022-12-29 NOTE — TELEPHONE ENCOUNTER
Patient is 22 weeks pregnant. dd 05/04/2023. Patient calls and states she is having contractions that have started in the middle of the night and getting worse. Patient states contractions last about a minute and come every 10-15 minutes. Patient states she has had 5 pregnancies and this feels like \"real contractions\". Patient denies any ROM, discharge or vaginal bleeding. Patient states she had an episode of emesis and still feels nauseated. Per office guidelines, writer instructs patient to go to L&D at [facility] and enter through the ED. Writer instructs patient to have someone else drive her to the hospital. Patient verbalizes understanding. Reason for Disposition   Health Information question, no triage required and triager able to answer question    Protocols used:  Information Only Call - No Triage-[facility]

## 2022-12-30 LAB
C TRACH DNA GENITAL QL NAA+PROBE: NEGATIVE
CULTURE: NORMAL
N. GONORRHOEAE DNA: NEGATIVE
SPECIMEN DESCRIPTION: NORMAL
SPECIMEN DESCRIPTION: NORMAL

## 2023-01-04 ENCOUNTER — ROUTINE PRENATAL (OUTPATIENT)
Dept: OBGYN CLINIC | Age: 38
End: 2023-01-04
Payer: COMMERCIAL

## 2023-01-04 VITALS
BODY MASS INDEX: 25.85 KG/M2 | HEART RATE: 82 BPM | DIASTOLIC BLOOD PRESSURE: 65 MMHG | WEIGHT: 170 LBS | SYSTOLIC BLOOD PRESSURE: 103 MMHG

## 2023-01-04 DIAGNOSIS — Z3A.22 22 WEEKS GESTATION OF PREGNANCY: Primary | ICD-10-CM

## 2023-01-04 PROCEDURE — 99212 OFFICE O/P EST SF 10 MIN: CPT | Performed by: OBSTETRICS & GYNECOLOGY

## 2023-01-04 NOTE — PROGRESS NOTES
Adrian Erickson is a  @ 22w6d who presents for NUNU visit. She denies LOF, VB or Ctxs.  + FM. She is having heartburn and does take papya extract and doesn't want any medications. She does have a lot of back pain as well. She went to the hospital for cramping and was found to have a UTI. She denies any fevers/chills, SOB, cough, sore throat, loss of taste/smell or sick contacts. Pt denies any HA, vision changes or RUQ pain. O:  Vitals:    23 0913   BP: 103/65   Pulse: 82     Gen: NAD  Abd: soft, nontender, gravid  Ext:  no edema      BP: 103/65  Weight: 170 lb (77.1 kg)  Heart Rate: 82  Patient Position: Sitting  Fundal Height (cm): 23 cm  Fetal HR: 150  Movement: Present    A/P:  Patient Active Problem List    Diagnosis Date Noted    HRP (high risk pregnancy), second trimester 2022     Priority: Medium    Hx IUGR 2022     Priority: Medium    AMA 2022     Priority: Medium     On ASA 81 mg      Umbilical hernia 42/15/8078     Priority: Medium    Complete Placenta Previa 2022     Noted on MFM scan 22      FHx VTE 2022     DVT - mother   MFM ordered thrombophilia w/u      Family history of spina bifida 2022     Half sister- had surgery at birth and at 9 yrs old, is ambulatory and has children        Discussed updated COVID precautions and policies. Reviewed updated visitor policy. Encouraged social distancing and appropriate hand washing/hygiene practices. Reviewed symptoms suspicious for COVID infection. Discussed that ACOG, SMFM, and the CDC recommend to not withold immunization in pregnant and breastfeeding women who meet criteria for receipt of the vaccine based on the ACIP recommended priority groups. All questions answered. Patient vocalized understanding.     Discussed heat/ice, baths for back pain  Pt still on abx for UTI  Discussed s/sx that should prompt call to the office  Discussed kick counts  RTC in 4 wks    Yadiel Gonzalez MD

## 2023-01-16 ENCOUNTER — TELEPHONE (OUTPATIENT)
Dept: OBGYN CLINIC | Age: 38
End: 2023-01-16

## 2023-01-16 NOTE — PROGRESS NOTES
Aliya Charlton is a   @ 24w5d who presents for NUNU visit. She denies LOF, VB or Ctxs.  + FM. She says her hernia is getting worse. She is having some [place]-Friend occasionally as well. She denies any fevers/chills, SOB, cough, sore throat, loss of taste/smell or sick contacts. Pt denies any HA, vision changes or RUQ pain. O:  Vitals:    23 1010   BP: 112/70   Pulse: 73     Gen: NAD  Abd: soft, nontender, gravid  Ext:  no edema      BP: 112/70  Weight: 172 lb (78 kg)  Heart Rate: 73  Patient Position: Sitting  Fundal Height (cm): 28 cm  Fetal HR: 147  Movement: Present    A/P:  Patient Active Problem List    Diagnosis Date Noted    HRP (high risk pregnancy), second trimester 2022     Priority: Medium    Hx IUGR 2022     Priority: Medium    AMA 2022     Priority: Medium     On ASA 81 mg      Umbilical hernia      Priority: Medium    Complete Placenta Previa 2022     Noted on MFM scan 22      FHx VTE 2022     DVT - mother   MFM ordered thrombophilia w/u      Family history of spina bifida 2022     Half sister- had surgery at birth and at 9 yrs old, is ambulatory and has children        Discussed updated COVID precautions and policies. Reviewed updated visitor policy. Encouraged social distancing and appropriate hand washing/hygiene practices. Reviewed symptoms suspicious for COVID infection. Discussed that ACOG, SMFM, and the CDC recommend to not withold immunization in pregnant and breastfeeding women who meet criteria for receipt of the vaccine based on the ACIP recommended priority groups. All questions answered. Patient vocalized understanding.     1 hr GTT & CBC today   Discussed s/sx that should prompt call to the office  Discussed kick counts  RTC in 4 wks    Sheldon Castellanos MD

## 2023-01-17 ENCOUNTER — HOSPITAL ENCOUNTER (OUTPATIENT)
Age: 38
Setting detail: SPECIMEN
Discharge: HOME | End: 2023-01-17

## 2023-01-17 ENCOUNTER — ROUTINE PRENATAL (OUTPATIENT)
Dept: OBGYN CLINIC | Age: 38
End: 2023-01-17
Payer: COMMERCIAL

## 2023-01-17 VITALS
WEIGHT: 172 LBS | HEART RATE: 73 BPM | BODY MASS INDEX: 26.15 KG/M2 | SYSTOLIC BLOOD PRESSURE: 112 MMHG | DIASTOLIC BLOOD PRESSURE: 70 MMHG

## 2023-01-17 DIAGNOSIS — Z3A.22 22 WEEKS GESTATION OF PREGNANCY: ICD-10-CM

## 2023-01-17 DIAGNOSIS — Z34.82 ENCOUNTER FOR SUPERVISION OF OTHER NORMAL PREGNANCY IN SECOND TRIMESTER: ICD-10-CM

## 2023-01-17 DIAGNOSIS — Z3A.24 24 WEEKS GESTATION OF PREGNANCY: Primary | ICD-10-CM

## 2023-01-17 LAB
GLUCOSE ADMINISTRATION: NORMAL
GLUCOSE TOLERANCE SCREEN 50G: 72 MG/DL (ref 70–135)
HCT VFR BLD CALC: 33.2 % (ref 36.3–47.1)
HEMOGLOBIN: 11.1 G/DL (ref 11.9–15.1)
MCH RBC QN AUTO: 33.6 PG (ref 25.2–33.5)
MCHC RBC AUTO-ENTMCNC: 33.4 G/DL (ref 28.4–34.8)
MCV RBC AUTO: 100.6 FL (ref 82.6–102.9)
NRBC AUTOMATED: 0 PER 100 WBC
PDW BLD-RTO: 11.8 % (ref 11.8–14.4)
PLATELET # BLD: 267 K/UL (ref 138–453)
PMV BLD AUTO: 10.1 FL (ref 8.1–13.5)
RBC # BLD: 3.3 M/UL (ref 3.95–5.11)
WBC # BLD: 9.6 K/UL (ref 3.5–11.3)

## 2023-01-17 PROCEDURE — 99213 OFFICE O/P EST LOW 20 MIN: CPT | Performed by: OBSTETRICS & GYNECOLOGY

## 2023-02-01 ENCOUNTER — ROUTINE PRENATAL (OUTPATIENT)
Dept: PERINATAL CARE | Age: 38
End: 2023-02-01
Payer: COMMERCIAL

## 2023-02-01 VITALS
HEART RATE: 70 BPM | HEIGHT: 68 IN | BODY MASS INDEX: 27.13 KG/M2 | TEMPERATURE: 98.1 F | SYSTOLIC BLOOD PRESSURE: 110 MMHG | WEIGHT: 179 LBS | DIASTOLIC BLOOD PRESSURE: 62 MMHG

## 2023-02-01 DIAGNOSIS — O44.00 PLACENTA PREVIA WITHOUT HEMORRHAGE, ANTEPARTUM: ICD-10-CM

## 2023-02-01 DIAGNOSIS — Z82.49 FAMILY HISTORY OF THROMBOEMBOLIC DISEASE: ICD-10-CM

## 2023-02-01 DIAGNOSIS — Z03.72 SUSPECTED PLACENTAL PROBLEM NOT FOUND: ICD-10-CM

## 2023-02-01 DIAGNOSIS — O09.522 MULTIGRAVIDA OF ADVANCED MATERNAL AGE IN SECOND TRIMESTER: ICD-10-CM

## 2023-02-01 DIAGNOSIS — Z3A.26 26 WEEKS GESTATION OF PREGNANCY: ICD-10-CM

## 2023-02-01 DIAGNOSIS — O09.292 HISTORY OF INTRAUTERINE GROWTH RESTRICTION IN PRIOR PREGNANCY, CURRENTLY PREGNANT, SECOND TRIMESTER: Primary | ICD-10-CM

## 2023-02-01 DIAGNOSIS — Z36.4 ULTRASOUND FOR ANTENATAL SCREENING FOR FETAL GROWTH RESTRICTION: ICD-10-CM

## 2023-02-01 DIAGNOSIS — O35.2XX0 HEREDITARY FAMILIAL DISEASE AFFECTING MANAGEMENT OF MOTHER AND POSSIBLY AFFECTING FETUS, ANTEPARTUM, SINGLE OR UNSPECIFIED FETUS: ICD-10-CM

## 2023-02-01 PROCEDURE — 76816 OB US FOLLOW-UP PER FETUS: CPT | Performed by: OBSTETRICS & GYNECOLOGY

## 2023-02-01 PROCEDURE — 99999 PR OFFICE/OUTPT VISIT,PROCEDURE ONLY: CPT | Performed by: OBSTETRICS & GYNECOLOGY

## 2023-02-01 PROCEDURE — 76817 TRANSVAGINAL US OBSTETRIC: CPT | Performed by: OBSTETRICS & GYNECOLOGY

## 2023-02-07 PROBLEM — O44.00 PLACENTA PREVIA: Status: RESOLVED | Noted: 2022-12-29 | Resolved: 2023-02-07

## 2023-02-13 ENCOUNTER — TELEPHONE (OUTPATIENT)
Dept: OBGYN CLINIC | Age: 38
End: 2023-02-13

## 2023-02-13 RX ORDER — FLUCONAZOLE 150 MG/1
150 TABLET ORAL ONCE
Qty: 1 TABLET | Refills: 0 | Status: SHIPPED | OUTPATIENT
Start: 2023-02-13 | End: 2023-02-13

## 2023-02-13 NOTE — TELEPHONE ENCOUNTER
Pt called she is 28w4d pregnant she believes she has a yeast infection she has itching and irritation  she is wondering if she can have a diflcan sent in please advise

## 2023-02-15 ENCOUNTER — INITIAL PRENATAL (OUTPATIENT)
Dept: OBGYN CLINIC | Age: 38
End: 2023-02-15
Payer: COMMERCIAL

## 2023-02-15 VITALS
BODY MASS INDEX: 27.37 KG/M2 | SYSTOLIC BLOOD PRESSURE: 114 MMHG | HEART RATE: 80 BPM | DIASTOLIC BLOOD PRESSURE: 70 MMHG | WEIGHT: 180 LBS

## 2023-02-15 DIAGNOSIS — Z23 NEED FOR DIPHTHERIA-TETANUS-PERTUSSIS (TDAP) VACCINE: ICD-10-CM

## 2023-02-15 DIAGNOSIS — Z3A.28 28 WEEKS GESTATION OF PREGNANCY: ICD-10-CM

## 2023-02-15 DIAGNOSIS — Z82.49 FAMILY HISTORY OF BLOOD CLOTS: ICD-10-CM

## 2023-02-15 DIAGNOSIS — O09.93 HRP (HIGH RISK PREGNANCY), THIRD TRIMESTER: Primary | ICD-10-CM

## 2023-02-15 DIAGNOSIS — Z87.59 HISTORY OF PRIOR PREGNANCY WITH IUGR NEWBORN: ICD-10-CM

## 2023-02-15 DIAGNOSIS — O44.02 PLACENTA PREVIA IN SECOND TRIMESTER: ICD-10-CM

## 2023-02-15 PROCEDURE — G8427 DOCREV CUR MEDS BY ELIG CLIN: HCPCS | Performed by: ADVANCED PRACTICE MIDWIFE

## 2023-02-15 PROCEDURE — G8419 CALC BMI OUT NRM PARAM NOF/U: HCPCS | Performed by: ADVANCED PRACTICE MIDWIFE

## 2023-02-15 PROCEDURE — 99214 OFFICE O/P EST MOD 30 MIN: CPT | Performed by: ADVANCED PRACTICE MIDWIFE

## 2023-02-15 PROCEDURE — G8484 FLU IMMUNIZE NO ADMIN: HCPCS | Performed by: ADVANCED PRACTICE MIDWIFE

## 2023-02-15 PROCEDURE — 1036F TOBACCO NON-USER: CPT | Performed by: ADVANCED PRACTICE MIDWIFE

## 2023-02-15 NOTE — PROGRESS NOTES
Pt is here today at her 28w6 prenatal visit  Pt states fetal movement is present  Pt has questions about care with midwife vs. Doctor care  Pt has small blood and leuks in urine dip

## 2023-02-16 PROBLEM — O09.93 HRP (HIGH RISK PREGNANCY), THIRD TRIMESTER: Status: ACTIVE | Noted: 2022-12-29

## 2023-02-16 PROBLEM — Z23 NEED FOR DIPHTHERIA-TETANUS-PERTUSSIS (TDAP) VACCINE: Status: ACTIVE | Noted: 2023-02-16

## 2023-02-21 ENCOUNTER — TELEPHONE (OUTPATIENT)
Dept: OBGYN CLINIC | Age: 38
End: 2023-02-21

## 2023-02-21 NOTE — TELEPHONE ENCOUNTER
Patient called in states that she had a UC  sent out at her last visit and had not heard from the office about the results. Writer reviewed chart and no UC was ordered at her last visit. Patient informed no UC was sent and pt states she is not having any symptoms and is still finishing her monistat for her yeast infection. Pt stated she will call the office if any uti symptoms arise or will re-evaluate at her next office visit.

## 2023-03-02 NOTE — PROGRESS NOTES
SUBJECTIVE:  Doris is here for her return OB visit. Doing well, voices concern for unknown UIT/vaginitis as had previously without any symptoms. Would like cultures sent today  Has hx of FGR and question on US  She reports fetal movement. She denies  vaginal bleeding. She denies  vaginal discharge. She denies leaking of fluid. She denies uterine contraction activity. She denies nausea and/or vomiting. She denies retaining fluid in her extremities. She denies headache, visual changes, epigastric pain    OBJECTIVE:  Blood pressure 122/82, pulse 82, weight 179 lb (81.2 kg), last menstrual period 2022. Doris DECLINED the Tdap vaccine as indicated      ASSESSMENT/PLAN:  1. 31 weeks gestation of pregnancy  S=D    2. HRP (high risk pregnancy), third trimester  The problem list was reviewed and updated with any new issues from today's visit  After reviewing and updating the problem list, the chart was sent to Dr Luiza Curry  for review    Doris will monitor fetal movement daily. [x] 28 week lab results were reviewed. [x] Fetal Kick Count was discussed and explained. [x] [place]-Friend contractions vs  labor contractions were reviewed. [x] Signs and symptoms of Pre-Eclampsia were were reviewed and discussed  [x] Initial discussion regarding birth plans was begun; continue discussion  [x] 2nd trimester packet was given    - Culture, Urine; Future  - Vaginitis DNA Probe; Future    3. Vaginal discharge  - Await results to treat  - Vaginitis DNA Probe; Future    4. Hematuria, unspecified type  - Await results to treat  - Culture, Urine; Future    5.  History of FGR  - US for growth scheduled given FGR history      Doris was counseled regarding all of the above    The patient, Ayana Dimas,  was seen with a total time spent of 20 minutes for the visit on this date of service by the [facility]  The time component, involved both face-to-face (counseling and education)  and non face-to-face time (care coordination), spent in determining the total time component.

## 2023-03-03 ENCOUNTER — HOSPITAL ENCOUNTER (OUTPATIENT)
Age: 38
Setting detail: SPECIMEN
Discharge: HOME | End: 2023-03-03

## 2023-03-03 ENCOUNTER — ROUTINE PRENATAL (OUTPATIENT)
Dept: OBGYN CLINIC | Age: 38
End: 2023-03-03
Payer: COMMERCIAL

## 2023-03-03 VITALS
SYSTOLIC BLOOD PRESSURE: 122 MMHG | HEART RATE: 82 BPM | BODY MASS INDEX: 27.22 KG/M2 | DIASTOLIC BLOOD PRESSURE: 82 MMHG | WEIGHT: 179 LBS

## 2023-03-03 DIAGNOSIS — N89.8 VAGINAL DISCHARGE: ICD-10-CM

## 2023-03-03 DIAGNOSIS — O09.93 HRP (HIGH RISK PREGNANCY), THIRD TRIMESTER: ICD-10-CM

## 2023-03-03 DIAGNOSIS — R31.9 HEMATURIA, UNSPECIFIED TYPE: ICD-10-CM

## 2023-03-03 DIAGNOSIS — Z3A.31 31 WEEKS GESTATION OF PREGNANCY: ICD-10-CM

## 2023-03-03 DIAGNOSIS — O09.93 HRP (HIGH RISK PREGNANCY), THIRD TRIMESTER: Primary | ICD-10-CM

## 2023-03-03 DIAGNOSIS — Z87.898 HISTORY OF POOR FETAL GROWTH: ICD-10-CM

## 2023-03-03 LAB
CANDIDA SPECIES, DNA PROBE: POSITIVE
GARDNERELLA VAGINALIS, DNA PROBE: NEGATIVE
SOURCE: ABNORMAL
TRICHOMONAS VAGINALIS DNA: NEGATIVE

## 2023-03-03 PROCEDURE — 99213 OFFICE O/P EST LOW 20 MIN: CPT | Performed by: ADVANCED PRACTICE MIDWIFE

## 2023-03-04 DIAGNOSIS — B37.31 YEAST VAGINITIS: Primary | ICD-10-CM

## 2023-03-04 DIAGNOSIS — O09.93 HRP (HIGH RISK PREGNANCY), THIRD TRIMESTER: ICD-10-CM

## 2023-03-04 LAB
MICROORGANISM SPEC CULT: NORMAL
SPECIMEN DESCRIPTION: NORMAL

## 2023-03-15 ENCOUNTER — ROUTINE PRENATAL (OUTPATIENT)
Dept: OBGYN CLINIC | Age: 38
End: 2023-03-15
Payer: COMMERCIAL

## 2023-03-15 VITALS
SYSTOLIC BLOOD PRESSURE: 128 MMHG | DIASTOLIC BLOOD PRESSURE: 78 MMHG | BODY MASS INDEX: 27.83 KG/M2 | HEART RATE: 98 BPM | WEIGHT: 183 LBS

## 2023-03-15 DIAGNOSIS — O09.93 HRP (HIGH RISK PREGNANCY), THIRD TRIMESTER: ICD-10-CM

## 2023-03-15 DIAGNOSIS — Z3A.32 32 WEEKS GESTATION OF PREGNANCY: Primary | ICD-10-CM

## 2023-03-15 DIAGNOSIS — O09.529 ANTEPARTUM MULTIGRAVIDA OF ADVANCED MATERNAL AGE: ICD-10-CM

## 2023-03-15 PROCEDURE — 1036F TOBACCO NON-USER: CPT | Performed by: ADVANCED PRACTICE MIDWIFE

## 2023-03-15 PROCEDURE — 99213 OFFICE O/P EST LOW 20 MIN: CPT | Performed by: ADVANCED PRACTICE MIDWIFE

## 2023-03-15 PROCEDURE — G8419 CALC BMI OUT NRM PARAM NOF/U: HCPCS | Performed by: ADVANCED PRACTICE MIDWIFE

## 2023-03-15 PROCEDURE — G8484 FLU IMMUNIZE NO ADMIN: HCPCS | Performed by: ADVANCED PRACTICE MIDWIFE

## 2023-03-15 PROCEDURE — G8427 DOCREV CUR MEDS BY ELIG CLIN: HCPCS | Performed by: ADVANCED PRACTICE MIDWIFE

## 2023-03-15 NOTE — PROGRESS NOTES
SUBJECTIVE:  Doris is here for her return OB visit. She reports fetal movement. She denies  vaginal bleeding. She denies  vaginal discharge. She denies leaking of fluid. She denies uterine contraction activity. She denies nausea and/or vomiting. She denies retaining fluid in her extremities. Feeling ok, baby is active. Is awakening at night with choking sensation and sometimes vomits. Advised Pepcid at night to help with reflux. OBJECTIVE:  Blood pressure 128/78, pulse 98, weight 183 lb (83 kg), last menstrual period 2022. Doris has not received the flu vaccine as appropriate  Doris has not received the Tdap vaccine as appropriate DECLINES      ASSESSMENT/PLAN:  1. 32 weeks gestation of pregnancy      2. HRP (high risk pregnancy), third trimester      3. AMA      The problem list was reviewed and updated with any new issues from today's visit  Doris will monitor fetal movement daily. 28 week lab results were reviewed. Fetal Kick Count was discussed and explained. [place]-Friend contractions vs  labor contractions were not reviewed. Signs and symptoms of Pre-Eclampsia were were reviewed and discussed  Initial discussion regarding birth plans was begun    Doris was counseled regarding all of the above    The patient, Frankey Quest,  was seen with a total time spent of 20 minutes for the visit on this date of service by the [facility]  The time component, involved both face-to-face (counseling and education)  and non face-to-face time (care coordination), spent in determining the total time component.

## 2023-03-15 NOTE — PROGRESS NOTES
Pt is here today at her 32w6 prenatal visit  Pt states fetal movement is present  Pt has concerns about waking up choking

## 2023-03-23 ENCOUNTER — ROUTINE PRENATAL (OUTPATIENT)
Dept: OBGYN CLINIC | Age: 38
End: 2023-03-23
Payer: COMMERCIAL

## 2023-03-23 ENCOUNTER — PROCEDURE VISIT (OUTPATIENT)
Dept: OBGYN CLINIC | Age: 38
End: 2023-03-23
Payer: COMMERCIAL

## 2023-03-23 VITALS — SYSTOLIC BLOOD PRESSURE: 128 MMHG | BODY MASS INDEX: 28.05 KG/M2 | WEIGHT: 184.5 LBS | DIASTOLIC BLOOD PRESSURE: 82 MMHG

## 2023-03-23 DIAGNOSIS — O09.93 HIGH-RISK PREGNANCY IN THIRD TRIMESTER: ICD-10-CM

## 2023-03-23 DIAGNOSIS — Z3A.34 34 WEEKS GESTATION OF PREGNANCY: ICD-10-CM

## 2023-03-23 DIAGNOSIS — O26.893 HEARTBURN DURING PREGNANCY IN THIRD TRIMESTER: ICD-10-CM

## 2023-03-23 DIAGNOSIS — O36.5990 FETAL GROWTH RESTRICTION ANTEPARTUM: ICD-10-CM

## 2023-03-23 DIAGNOSIS — R12 HEARTBURN DURING PREGNANCY IN THIRD TRIMESTER: ICD-10-CM

## 2023-03-23 DIAGNOSIS — O09.529 ANTEPARTUM MULTIGRAVIDA OF ADVANCED MATERNAL AGE: ICD-10-CM

## 2023-03-23 DIAGNOSIS — Z87.59 HISTORY OF PRIOR PREGNANCY WITH IUGR NEWBORN: ICD-10-CM

## 2023-03-23 DIAGNOSIS — O09.93 HRP (HIGH RISK PREGNANCY), THIRD TRIMESTER: Primary | ICD-10-CM

## 2023-03-23 PROCEDURE — G8419 CALC BMI OUT NRM PARAM NOF/U: HCPCS | Performed by: ADVANCED PRACTICE MIDWIFE

## 2023-03-23 PROCEDURE — 99214 OFFICE O/P EST MOD 30 MIN: CPT | Performed by: ADVANCED PRACTICE MIDWIFE

## 2023-03-23 PROCEDURE — G8427 DOCREV CUR MEDS BY ELIG CLIN: HCPCS | Performed by: ADVANCED PRACTICE MIDWIFE

## 2023-03-23 PROCEDURE — 76816 OB US FOLLOW-UP PER FETUS: CPT | Performed by: OBSTETRICS & GYNECOLOGY

## 2023-03-23 PROCEDURE — 1036F TOBACCO NON-USER: CPT | Performed by: ADVANCED PRACTICE MIDWIFE

## 2023-03-23 PROCEDURE — G8484 FLU IMMUNIZE NO ADMIN: HCPCS | Performed by: ADVANCED PRACTICE MIDWIFE

## 2023-03-23 RX ORDER — FAMOTIDINE 40 MG/1
20 TABLET, FILM COATED ORAL EVERY EVENING
Qty: 30 TABLET | Refills: 3 | Status: CANCELLED | OUTPATIENT
Start: 2023-03-23

## 2023-03-23 RX ORDER — FAMOTIDINE 20 MG/1
20 TABLET, FILM COATED ORAL 2 TIMES DAILY
Qty: 60 TABLET | Refills: 3 | Status: SHIPPED | OUTPATIENT
Start: 2023-03-23

## 2023-03-23 NOTE — PROGRESS NOTES
Pt is here today at her 34 pnv  Pt states fetal movement is present  Pt has no concerns
referral to Maternal Fetal  Await physician final read, ultrasonographer shows [facility] 2nd percentile. Plan for MFM to scan patient. Reviewed will proceed as FGR at this time. Plan for weekly BPP/NSTs and serial growth unless MFM scan is normal  [facility] reviewed  Reviewed if FGR persists will recommend induction, pt understanding   AMA  Did not discuss induction recommendations today   Taking ASA  Heartburn during pregnancy in third trimester  famotidine (PEPCID) 20 MG tablet; Take 1 tablet by mouth 2 times daily  Reviewed lifestyle changes and diet triggers  History of FGR  - 2/1/23 EFW 49%  - Growth US today - abnormal, see above      The problem list was reviewed and updated with any new issues from today's visit. Return in about 1 week (around 3/30/2023) for BPP/NST with midwives. The patient, Danisha Fagan, was seen with a total time spent of 30 minutes for the visit on this date of service by the [facility]  The time component involved both face-to-face (counseling and education) and non face-to-face time (care coordination), spent in determining the total time component.       Electronically signed by: ANGELO Palencia CNM

## 2023-03-23 NOTE — PROGRESS NOTES
BPP w/o NST  US Preg Uterus Follow Up    29 WK IUP  EFW: 4lb 7oz  BPD: 13.4%  HC: 34.3%  AC: 2.1%  FL: 38.5%    SHARMILA: 12.35 cm  HR: 147 bpm  Posterior placenta  Cephalic presentation  Active fetal movements  BPP 8/8    AC measuring small, measured several times

## 2023-03-29 PROBLEM — O36.5990 FETAL GROWTH RESTRICTION ANTEPARTUM: Status: ACTIVE | Noted: 2023-03-29

## 2023-03-30 ENCOUNTER — ROUTINE PRENATAL (OUTPATIENT)
Dept: PERINATAL CARE | Age: 38
End: 2023-03-30
Payer: COMMERCIAL

## 2023-03-30 VITALS
RESPIRATION RATE: 16 BRPM | WEIGHT: 182 LBS | BODY MASS INDEX: 27.58 KG/M2 | HEIGHT: 68 IN | SYSTOLIC BLOOD PRESSURE: 112 MMHG | HEART RATE: 72 BPM | TEMPERATURE: 98.1 F | DIASTOLIC BLOOD PRESSURE: 76 MMHG

## 2023-03-30 DIAGNOSIS — O26.843 UTERINE SIZE-DATE DISCREPANCY IN THIRD TRIMESTER: ICD-10-CM

## 2023-03-30 DIAGNOSIS — O09.523 MULTIGRAVIDA OF ADVANCED MATERNAL AGE IN THIRD TRIMESTER: ICD-10-CM

## 2023-03-30 DIAGNOSIS — Z36.4 ULTRASOUND FOR ANTENATAL SCREENING FOR FETAL GROWTH RESTRICTION: ICD-10-CM

## 2023-03-30 DIAGNOSIS — O09.293 HISTORY OF INTRAUTERINE GROWTH RESTRICTION IN PRIOR PREGNANCY, CURRENTLY PREGNANT, THIRD TRIMESTER: ICD-10-CM

## 2023-03-30 DIAGNOSIS — Z3A.35 35 WEEKS GESTATION OF PREGNANCY: ICD-10-CM

## 2023-03-30 DIAGNOSIS — Z03.74 SUSPECTED PROBLEM WITH FETAL GROWTH NOT FOUND: ICD-10-CM

## 2023-03-30 DIAGNOSIS — O36.5930 INTRAUTERINE GROWTH RESTRICTION (IUGR) AFFECTING CARE OF MOTHER, THIRD TRIMESTER, SINGLE GESTATION: Primary | ICD-10-CM

## 2023-03-30 DIAGNOSIS — Z13.89 ENCOUNTER FOR ROUTINE SCREENING FOR MALFORMATION USING ULTRASONICS: ICD-10-CM

## 2023-03-30 LAB
ABDOMINAL CIRCUMFERENCE: NORMAL
BIPARIETAL DIAMETER: NORMAL
ESTIMATED FETAL WEIGHT: NORMAL
FEMORAL DIAMETER: NORMAL
HC/AC: NORMAL
HEAD CIRCUMFERENCE: NORMAL

## 2023-03-30 PROCEDURE — 99999 PR OFFICE/OUTPT VISIT,PROCEDURE ONLY: CPT | Performed by: OBSTETRICS & GYNECOLOGY

## 2023-03-30 PROCEDURE — 76819 FETAL BIOPHYS PROFIL W/O NST: CPT | Performed by: OBSTETRICS & GYNECOLOGY

## 2023-03-30 PROCEDURE — 76805 OB US >/= 14 WKS SNGL FETUS: CPT | Performed by: OBSTETRICS & GYNECOLOGY

## 2023-04-04 ENCOUNTER — HOSPITAL ENCOUNTER (OUTPATIENT)
Age: 38
Setting detail: SPECIMEN
Discharge: HOME | End: 2023-04-04

## 2023-04-04 ENCOUNTER — ROUTINE PRENATAL (OUTPATIENT)
Dept: OBGYN CLINIC | Age: 38
End: 2023-04-04
Payer: MEDICAID

## 2023-04-04 VITALS
SYSTOLIC BLOOD PRESSURE: 110 MMHG | DIASTOLIC BLOOD PRESSURE: 74 MMHG | WEIGHT: 184 LBS | HEART RATE: 101 BPM | BODY MASS INDEX: 27.98 KG/M2

## 2023-04-04 DIAGNOSIS — Z3A.35 35 WEEKS GESTATION OF PREGNANCY: ICD-10-CM

## 2023-04-04 DIAGNOSIS — O09.93 HRP (HIGH RISK PREGNANCY), THIRD TRIMESTER: ICD-10-CM

## 2023-04-04 DIAGNOSIS — O09.529 ANTEPARTUM MULTIGRAVIDA OF ADVANCED MATERNAL AGE: ICD-10-CM

## 2023-04-04 DIAGNOSIS — O09.93 HRP (HIGH RISK PREGNANCY), THIRD TRIMESTER: Primary | ICD-10-CM

## 2023-04-04 PROCEDURE — 99213 OFFICE O/P EST LOW 20 MIN: CPT | Performed by: ADVANCED PRACTICE MIDWIFE

## 2023-04-04 NOTE — PROGRESS NOTES
Pt is here today at her 35w5 prenatal visit with GBS  Pt states fetal movement is present  Pt has no concerns  EPDS-1
as necessary  [] Importance of compliance with treatment plan discussed  [] Counseling patient/support person  [] Coordinating care

## 2023-04-07 LAB
MICROORGANISM SPEC CULT: NORMAL
SPECIMEN DESCRIPTION: NORMAL

## 2023-04-17 ENCOUNTER — ROUTINE PRENATAL (OUTPATIENT)
Dept: OBGYN CLINIC | Age: 38
End: 2023-04-17
Payer: MEDICAID

## 2023-04-17 VITALS — DIASTOLIC BLOOD PRESSURE: 64 MMHG | SYSTOLIC BLOOD PRESSURE: 106 MMHG | BODY MASS INDEX: 28.13 KG/M2 | WEIGHT: 185 LBS

## 2023-04-17 DIAGNOSIS — O09.93 HRP (HIGH RISK PREGNANCY), THIRD TRIMESTER: Primary | ICD-10-CM

## 2023-04-17 PROCEDURE — 99212 OFFICE O/P EST SF 10 MIN: CPT | Performed by: ADVANCED PRACTICE MIDWIFE

## 2023-04-17 NOTE — PROGRESS NOTES
SUBJECTIVE:  Doris is here for her routine OB visit. She reports fetal movement. She denies vaginal bleeding. She denies leaking of fluid. She denies vaginal discharge. She denies uterine contraction activity. She denies nausea and/or vomiting. She denies retaining fluid in her extremities  She denies headache, visual changes, epigastric discomfort  Doris reports feeling well. She is scheduled for a hospital tour this weekend. OBJECTIVE:   Blood pressure 106/64, weight 185 lb (83.9 kg), last menstrual period 07/28/2022. Pregravid BMI: Could not be calculated   TWG: Not found. Doris has not received the Tdap vaccine as appropriate  Rhogam was not indicated    ASSESSMENT/PLAN:  IUP @ 37w4d  Doris will monitor for fetal movements daily  []  GBS culture was not obtained. Results were reviewed. She is negative. []  28 week lab results were reviewed. Glucola 72, Hgb 11.1. []  Signs of labor to report were  and when to call midwives was discussed  []  Birth preferences were not discussed. []  Has been given 36 week birth packet for review. []  Signs and symptoms of Pre-Eclampsia were not reviewed and discussed. []  Post-dates testing and protocol were not discussed  []  Doris was not counseled regarding Post Partum Depression and help  []  She has not decided on contraceptive choice. []  Chart has been reviewed by collaborating physician. S = D  Doris was seen today for routine prenatal visit. Diagnoses and all orders for this visit:    HRP (high risk pregnancy), third trimester           []  Induction management was discussed and induction scheduled     No follow-ups on file.      Doris was counseled regarding all of the above    The patient, Jairo Reyes, was seen with a total time spent of 15 minutes for the visit on this date of service by the [facility]  The time component involved both face-to-face (counseling and education) and non face-to-face time (care coordination), spent in

## 2023-04-20 ENCOUNTER — HOSPITAL ENCOUNTER (INPATIENT)
Age: 38
LOS: 1 days | Discharge: HOME | DRG: 560 | End: 2023-04-21
Attending: OBSTETRICS & GYNECOLOGY | Admitting: OBSTETRICS & GYNECOLOGY
Payer: MEDICAID

## 2023-04-20 ENCOUNTER — ROUTINE PRENATAL (OUTPATIENT)
Dept: PERINATAL CARE | Age: 38
End: 2023-04-20
Payer: MEDICAID

## 2023-04-20 VITALS
BODY MASS INDEX: 28.19 KG/M2 | WEIGHT: 186 LBS | HEIGHT: 68 IN | DIASTOLIC BLOOD PRESSURE: 71 MMHG | RESPIRATION RATE: 16 BRPM | TEMPERATURE: 98.1 F | SYSTOLIC BLOOD PRESSURE: 118 MMHG | HEART RATE: 79 BPM

## 2023-04-20 DIAGNOSIS — O36.8190 DECREASED FETAL MOVEMENT DURING PREGNANCY, ANTEPARTUM, SINGLE OR UNSPECIFIED FETUS: ICD-10-CM

## 2023-04-20 DIAGNOSIS — O36.5930 INTRAUTERINE GROWTH RESTRICTION (IUGR) AFFECTING CARE OF MOTHER, THIRD TRIMESTER, SINGLE GESTATION: Primary | ICD-10-CM

## 2023-04-20 DIAGNOSIS — Z3A.38 38 WEEKS GESTATION OF PREGNANCY: ICD-10-CM

## 2023-04-20 DIAGNOSIS — O09.523 MULTIGRAVIDA OF ADVANCED MATERNAL AGE IN THIRD TRIMESTER: ICD-10-CM

## 2023-04-20 DIAGNOSIS — O09.293 HISTORY OF INTRAUTERINE GROWTH RESTRICTION IN PRIOR PREGNANCY, CURRENTLY PREGNANT, THIRD TRIMESTER: ICD-10-CM

## 2023-04-20 DIAGNOSIS — Z36.4 ULTRASOUND FOR ANTENATAL SCREENING FOR FETAL GROWTH RESTRICTION: ICD-10-CM

## 2023-04-20 DIAGNOSIS — O41.00X0 OLIGOHYDRAMNIOS, ANTEPARTUM, SINGLE OR UNSPECIFIED FETUS: ICD-10-CM

## 2023-04-20 LAB
ABDOMINAL CIRCUMFERENCE: NORMAL
ABO/RH: NORMAL
ABSOLUTE EOS #: 0.3 K/UL (ref 0–0.44)
ABSOLUTE IMMATURE GRANULOCYTE: 0.09 K/UL (ref 0–0.3)
ABSOLUTE LYMPH #: 1.57 K/UL (ref 1.1–3.7)
ABSOLUTE MONO #: 0.58 K/UL (ref 0.1–1.2)
AMPHETAMINE SCREEN URINE: NEGATIVE
ANTIBODY SCREEN: NEGATIVE
ARM BAND NUMBER: NORMAL
BARBITURATE SCREEN URINE: NEGATIVE
BASOPHILS # BLD: 1 % (ref 0–2)
BASOPHILS ABSOLUTE: 0.05 K/UL (ref 0–0.2)
BENZODIAZEPINE SCREEN, URINE: NEGATIVE
BIPARIETAL DIAMETER: NORMAL
CANNABINOID SCREEN URINE: NEGATIVE
COCAINE METABOLITE, URINE: NEGATIVE
EOSINOPHILS RELATIVE PERCENT: 3 % (ref 1–4)
ESTIMATED FETAL WEIGHT: NORMAL
EXPIRATION DATE: NORMAL
FEMORAL DIAMETER: NORMAL
FENTANYL URINE: NEGATIVE
HC/AC: NORMAL
HCT VFR BLD AUTO: 36.8 % (ref 36.3–47.1)
HEAD CIRCUMFERENCE: NORMAL
HGB BLD-MCNC: 11.9 G/DL (ref 11.9–15.1)
IMMATURE GRANULOCYTES: 1 %
LYMPHOCYTES # BLD: 14 % (ref 24–43)
MCH RBC QN AUTO: 31.6 PG (ref 25.2–33.5)
MCHC RBC AUTO-ENTMCNC: 32.3 G/DL (ref 28.4–34.8)
MCV RBC AUTO: 97.6 FL (ref 82.6–102.9)
METHADONE SCREEN, URINE: NEGATIVE
MONOCYTES # BLD: 5 % (ref 3–12)
NRBC AUTOMATED: 0 PER 100 WBC
OPIATES, URINE: NEGATIVE
OXYCODONE SCREEN URINE: NEGATIVE
PDW BLD-RTO: 12.9 % (ref 11.8–14.4)
PHENCYCLIDINE, URINE: NEGATIVE
PLATELET # BLD AUTO: 329 K/UL (ref 138–453)
PMV BLD AUTO: 9.4 FL (ref 8.1–13.5)
RBC # BLD: 3.77 M/UL (ref 3.95–5.11)
SEG NEUTROPHILS: 76 % (ref 36–65)
SEGMENTED NEUTROPHILS ABSOLUTE COUNT: 8.29 K/UL (ref 1.5–8.1)
T PALLIDUM AB SER QL IA: NONREACTIVE
TEST INFORMATION: NORMAL
WBC # BLD AUTO: 10.9 K/UL (ref 3.5–11.3)

## 2023-04-20 PROCEDURE — 76821 MIDDLE CEREBRAL ARTERY ECHO: CPT | Performed by: OBSTETRICS & GYNECOLOGY

## 2023-04-20 PROCEDURE — 80307 DRUG TEST PRSMV CHEM ANLYZR: CPT

## 2023-04-20 PROCEDURE — 86780 TREPONEMA PALLIDUM: CPT

## 2023-04-20 PROCEDURE — 3E033VJ INTRODUCTION OF OTHER HORMONE INTO PERIPHERAL VEIN, PERCUTANEOUS APPROACH: ICD-10-PCS | Performed by: ADVANCED PRACTICE MIDWIFE

## 2023-04-20 PROCEDURE — 99024 POSTOP FOLLOW-UP VISIT: CPT | Performed by: ADVANCED PRACTICE MIDWIFE

## 2023-04-20 PROCEDURE — 6360000002 HC RX W HCPCS: Performed by: STUDENT IN AN ORGANIZED HEALTH CARE EDUCATION/TRAINING PROGRAM

## 2023-04-20 PROCEDURE — 86900 BLOOD TYPING SEROLOGIC ABO: CPT

## 2023-04-20 PROCEDURE — 1220000000 HC SEMI PRIVATE OB R&B

## 2023-04-20 PROCEDURE — 10907ZC DRAINAGE OF AMNIOTIC FLUID, THERAPEUTIC FROM PRODUCTS OF CONCEPTION, VIA NATURAL OR ARTIFICIAL OPENING: ICD-10-PCS | Performed by: ADVANCED PRACTICE MIDWIFE

## 2023-04-20 PROCEDURE — 86901 BLOOD TYPING SEROLOGIC RH(D): CPT

## 2023-04-20 PROCEDURE — 76816 OB US FOLLOW-UP PER FETUS: CPT | Performed by: OBSTETRICS & GYNECOLOGY

## 2023-04-20 PROCEDURE — 86850 RBC ANTIBODY SCREEN: CPT

## 2023-04-20 PROCEDURE — 2580000003 HC RX 258

## 2023-04-20 PROCEDURE — 7200000001 HC VAGINAL DELIVERY

## 2023-04-20 PROCEDURE — 85025 COMPLETE CBC W/AUTO DIFF WBC: CPT

## 2023-04-20 PROCEDURE — 76820 UMBILICAL ARTERY ECHO: CPT | Performed by: OBSTETRICS & GYNECOLOGY

## 2023-04-20 PROCEDURE — 76819 FETAL BIOPHYS PROFIL W/O NST: CPT | Performed by: OBSTETRICS & GYNECOLOGY

## 2023-04-20 PROCEDURE — 10H073Z INSERTION OF MONITORING ELECTRODE INTO PRODUCTS OF CONCEPTION, VIA NATURAL OR ARTIFICIAL OPENING: ICD-10-PCS | Performed by: ADVANCED PRACTICE MIDWIFE

## 2023-04-20 PROCEDURE — 59409 OBSTETRICAL CARE: CPT | Performed by: ADVANCED PRACTICE MIDWIFE

## 2023-04-20 RX ORDER — LANOLIN 72 %
OINTMENT (GRAM) TOPICAL PRN
Status: DISCONTINUED | OUTPATIENT
Start: 2023-04-20 | End: 2023-04-22 | Stop reason: HOSPADM

## 2023-04-20 RX ORDER — SODIUM CHLORIDE, SODIUM LACTATE, POTASSIUM CHLORIDE, AND CALCIUM CHLORIDE .6; .31; .03; .02 G/100ML; G/100ML; G/100ML; G/100ML
500 INJECTION, SOLUTION INTRAVENOUS PRN
Status: DISCONTINUED | OUTPATIENT
Start: 2023-04-20 | End: 2023-04-21

## 2023-04-20 RX ORDER — SODIUM CHLORIDE 9 MG/ML
INJECTION, SOLUTION INTRAVENOUS PRN
Status: DISCONTINUED | OUTPATIENT
Start: 2023-04-20 | End: 2023-04-22 | Stop reason: HOSPADM

## 2023-04-20 RX ORDER — KETOROLAC TROMETHAMINE 30 MG/ML
30 INJECTION, SOLUTION INTRAMUSCULAR; INTRAVENOUS ONCE
Status: COMPLETED | OUTPATIENT
Start: 2023-04-20 | End: 2023-04-20

## 2023-04-20 RX ORDER — SODIUM CHLORIDE 0.9 % (FLUSH) 0.9 %
5-40 SYRINGE (ML) INJECTION PRN
Status: DISCONTINUED | OUTPATIENT
Start: 2023-04-20 | End: 2023-04-21

## 2023-04-20 RX ORDER — SODIUM CHLORIDE, SODIUM LACTATE, POTASSIUM CHLORIDE, CALCIUM CHLORIDE 600; 310; 30; 20 MG/100ML; MG/100ML; MG/100ML; MG/100ML
INJECTION, SOLUTION INTRAVENOUS CONTINUOUS
Status: DISCONTINUED | OUTPATIENT
Start: 2023-04-20 | End: 2023-04-21

## 2023-04-20 RX ORDER — SODIUM CHLORIDE 0.9 % (FLUSH) 0.9 %
5-40 SYRINGE (ML) INJECTION EVERY 12 HOURS SCHEDULED
Status: DISCONTINUED | OUTPATIENT
Start: 2023-04-20 | End: 2023-04-21

## 2023-04-20 RX ORDER — SODIUM CHLORIDE 0.9 % (FLUSH) 0.9 %
5-40 SYRINGE (ML) INJECTION EVERY 12 HOURS SCHEDULED
Status: DISCONTINUED | OUTPATIENT
Start: 2023-04-21 | End: 2023-04-22 | Stop reason: HOSPADM

## 2023-04-20 RX ORDER — SODIUM CHLORIDE 9 MG/ML
25 INJECTION, SOLUTION INTRAVENOUS PRN
Status: DISCONTINUED | OUTPATIENT
Start: 2023-04-20 | End: 2023-04-21

## 2023-04-20 RX ORDER — DOCUSATE SODIUM 100 MG/1
100 CAPSULE, LIQUID FILLED ORAL 2 TIMES DAILY
Status: DISCONTINUED | OUTPATIENT
Start: 2023-04-21 | End: 2023-04-22 | Stop reason: HOSPADM

## 2023-04-20 RX ORDER — ACETAMINOPHEN 500 MG
1000 TABLET ORAL EVERY 8 HOURS
Status: DISCONTINUED | OUTPATIENT
Start: 2023-04-21 | End: 2023-04-22 | Stop reason: HOSPADM

## 2023-04-20 RX ORDER — DIPHENHYDRAMINE HCL 25 MG
25 TABLET ORAL EVERY 4 HOURS PRN
Status: DISCONTINUED | OUTPATIENT
Start: 2023-04-20 | End: 2023-04-21

## 2023-04-20 RX ORDER — ONDANSETRON 2 MG/ML
4 INJECTION INTRAMUSCULAR; INTRAVENOUS EVERY 6 HOURS PRN
Status: DISCONTINUED | OUTPATIENT
Start: 2023-04-20 | End: 2023-04-21

## 2023-04-20 RX ORDER — ACETAMINOPHEN 500 MG
1000 TABLET ORAL EVERY 6 HOURS PRN
Status: DISCONTINUED | OUTPATIENT
Start: 2023-04-20 | End: 2023-04-21

## 2023-04-20 RX ORDER — IBUPROFEN 600 MG/1
600 TABLET ORAL EVERY 6 HOURS SCHEDULED
Status: DISCONTINUED | OUTPATIENT
Start: 2023-04-21 | End: 2023-04-22 | Stop reason: HOSPADM

## 2023-04-20 RX ORDER — SODIUM CHLORIDE, SODIUM LACTATE, POTASSIUM CHLORIDE, AND CALCIUM CHLORIDE .6; .31; .03; .02 G/100ML; G/100ML; G/100ML; G/100ML
1000 INJECTION, SOLUTION INTRAVENOUS PRN
Status: DISCONTINUED | OUTPATIENT
Start: 2023-04-20 | End: 2023-04-21

## 2023-04-20 RX ORDER — ONDANSETRON 4 MG/1
8 TABLET, ORALLY DISINTEGRATING ORAL EVERY 8 HOURS PRN
Status: DISCONTINUED | OUTPATIENT
Start: 2023-04-20 | End: 2023-04-22 | Stop reason: HOSPADM

## 2023-04-20 RX ORDER — SODIUM CHLORIDE 0.9 % (FLUSH) 0.9 %
5-40 SYRINGE (ML) INJECTION PRN
Status: DISCONTINUED | OUTPATIENT
Start: 2023-04-20 | End: 2023-04-22 | Stop reason: HOSPADM

## 2023-04-20 RX ADMIN — Medication 1 MILLI-UNITS/MIN: at 14:02

## 2023-04-20 RX ADMIN — KETOROLAC TROMETHAMINE 30 MG: 30 INJECTION, SOLUTION INTRAMUSCULAR; INTRAVENOUS at 22:48

## 2023-04-20 RX ADMIN — SODIUM CHLORIDE, POTASSIUM CHLORIDE, SODIUM LACTATE AND CALCIUM CHLORIDE 125 ML: 600; 310; 30; 20 INJECTION, SOLUTION INTRAVENOUS at 14:01

## 2023-04-20 NOTE — H&P
1   TOTAL        4          MEMBRANES:  Intact                      FETAL HEART RATE:       Baseline: 135     Variability: moderate     Accelerations: present     Decelerations: absent            CONTRACTIONS: Frequency: Occasional                           Duration:                            Intensity: Mild       ASSESSMENT/PLAN:  Mat Marcello is a 30 y.o. female  [id number] At 38w0d  Induction of labor for FGR and Olighydramnios  - Admit, routine orders  - Written consent for UDS obtained  - Induction methods discussed based on cervical exam, recommendation for [facility] balloon with low dose Pitocin and Crystal is agreeable  - Dr Rell Domínguez, in-house Attending, notified of admission  FHR: Category 1  GBS Negative  - No indication for prophylaxis  Fetal Growth Restriction (with prior history of FGR)  - Reported as less 10%  AMA  - Negative NIPT  Umbilical hernia  - No issues  Family history of Spina Bifida  - Negative AFP  Family history of DVT  - No personal history  - Negative Coagulation labs  History of Placenta Previa in pregnancy (resolved)

## 2023-04-20 NOTE — FLOWSHEET NOTE
Cooks balloon placed per Dr Ruby Barrios. RN inflated both balloons with 40cc of NS. Pt tolerated well.

## 2023-04-20 NOTE — PROGRESS NOTES
Obstetric/Gynecology Maternal Fetal Medicine Resident Note    Patient presents for M US today. Patient complains of 48 hrs of decreased fetal movement. Patient reports positive but decreased fetal movement at this time. US reveals FGR (AC<10th%), SHARMLIA 4.3 cm, and 8/8 BPP. Per ACOG guidelines, patient is in the delivery window for FGR @ 38w0d and IOL is recommended at this time. Discussed case with Christiano Badillo who is in agreement with assessment and plan. Patient will present to L&D for IOL 2/2 FGR (AC<10th%) @ 38w0d.      Nayely Echeverria MD  OBGYN Resident, PGY1  [facility]  4/20/2023, 11:03 AM

## 2023-04-20 NOTE — FLOWSHEET NOTE
RN added an additional 40cc to both uterine & vaginal balloon. Total of 80cc in both. Pt tolerated well.

## 2023-04-20 NOTE — PROGRESS NOTES
I would advise delivery today at 45 0/7 weeks' gestation for fetal growth restriction (with oligohydramnios) and given the maternal/fetal medical/obstetrical complications of pregnancy), as per [employer] of Obstetricians and Gynecologists and the Society for Maternal-Fetal Medicine guidelines in the ACOG Committee Opinion Number [phone]  (\"Medically Indicated Late- and Early-Term Deliveries\", Obstetrics & Gynecology, Volume 138, NO.1, 2021. Above reviewed with the patient today. She is amenable to the above plan of care for delivery as well and all questions have been answered. Labor and delivery and covering obstetrics provider (Ms. Danielle Shelton) notified of above findings and plan of care appropriately coordinated for delivery. Please refer to [address]ter resident progress note in EPIC.

## 2023-04-20 NOTE — FLOWSHEET NOTE
Pt to L&D from [facility] for IOL. Pt states she has been having asher naidu. Pt denies LOF, vaginal bleeding.  +FM but states its been less than normal. Eunice aware of arrival.

## 2023-04-20 NOTE — CARE COORDINATION
ANTEPARTUM NOTE    38 weeks gestation of pregnancy [Z3A.38]    Doris was admitted to L&D on 4/20/23 for IOL due to [address] @ 38w0d    OB GYN Provider: MMW    Will meet with patient after delivery to verify name/address/phone/insurance and discuss discharge planning. Anticipate DC home 2 nights after vaginal delivery or 4 nights after C/S delivery as long as hemodynamically stable.

## 2023-04-21 VITALS
HEART RATE: 62 BPM | OXYGEN SATURATION: 99 % | RESPIRATION RATE: 16 BRPM | TEMPERATURE: 98.2 F | DIASTOLIC BLOOD PRESSURE: 78 MMHG | SYSTOLIC BLOOD PRESSURE: 120 MMHG

## 2023-04-21 PROBLEM — O44.00 PLACENTA PREVIA: Status: RESOLVED | Noted: 2022-12-29 | Resolved: 2023-04-21

## 2023-04-21 PROBLEM — Z3A.38 38 WEEKS GESTATION OF PREGNANCY: Status: RESOLVED | Noted: 2023-04-20 | Resolved: 2023-04-21

## 2023-04-21 PROBLEM — Z23 NEED FOR DIPHTHERIA-TETANUS-PERTUSSIS (TDAP) VACCINE: Status: RESOLVED | Noted: 2023-02-16 | Resolved: 2023-04-21

## 2023-04-21 PROCEDURE — 99024 POSTOP FOLLOW-UP VISIT: CPT | Performed by: ADVANCED PRACTICE MIDWIFE

## 2023-04-21 PROCEDURE — 6370000000 HC RX 637 (ALT 250 FOR IP): Performed by: ADVANCED PRACTICE MIDWIFE

## 2023-04-21 RX ORDER — IBUPROFEN 600 MG/1
600 TABLET ORAL EVERY 6 HOURS PRN
Qty: 120 TABLET | Refills: 3 | Status: SHIPPED | OUTPATIENT
Start: 2023-04-21

## 2023-04-21 RX ADMIN — DOCUSATE SODIUM 100 MG: 100 CAPSULE ORAL at 08:36

## 2023-04-21 RX ADMIN — IBUPROFEN 600 MG: 600 TABLET, FILM COATED ORAL at 08:36

## 2023-04-21 ASSESSMENT — PAIN DESCRIPTION - LOCATION: LOCATION: ABDOMEN

## 2023-04-21 ASSESSMENT — PAIN SCALES - GENERAL: PAINLEVEL_OUTOF10: 2

## 2023-04-21 ASSESSMENT — PAIN DESCRIPTION - DESCRIPTORS: DESCRIPTORS: CRAMPING;DULL

## 2023-04-21 ASSESSMENT — PAIN DESCRIPTION - ORIENTATION: ORIENTATION: LOWER

## 2023-04-21 NOTE — FLOWSHEET NOTE
Patient admitted to room [phone] from L&D via wheelchair accompanied by RN. Oriented to room and surroundings. Plan of care reviewed. Verbalized understanding. Instructed on infant security and safe sleep practices. Preventing falls education provided . The following handouts given: A New Beginning: Your Guide to Postpartum Care, Rounding, [facility] Security System,Babies Cry A lot, Safe Sleep, Security and Visitation Guidelines. Call light placed within reach.

## 2023-04-21 NOTE — LACTATION NOTE
Pt states she desires 24 hour discharge this evening, reviewed discharge instructions. Pt states breastfeeding is going well, and does not have a pump at home. Signed medical necessity form given to pt and encouraged to call lactation office with any questions or concerns.

## 2023-04-21 NOTE — DISCHARGE SUMMARY
Obstetric Discharge Summary  [address]    Patient Name: Stevan Armendariz  Patient : 1985  Primary Care Physician: No primary care provider on file. Admit Date: 2023    Principal Diagnosis: IUP at 38w0d, admitted for IOL 2/2 FGR (AC <10th%)     Her pregnancy has been complicated by:   Patient Active Problem List   Diagnosis    Family history of spina bifida    Umbilical hernia    AMA    FHx VTE    HRP (high risk pregnancy), third trimester    Hx IUGR    RESOLVED - Complete Placenta Previa    DECLINES Tdap    Fetal growth restriction antepartum    38 weeks gestation of pregnancy       Infection Present?: {YES / IZ:94453}  Hospital Acquired: {YES / HP:42377}    Surgical Operations & Procedures:  Analgesia: none  Delivery Type: Spontaneous Vaginal Delivery: See Labor and Delivery Summary   Laceration(s): Absent    Consultations: ***    Pertinent Findings & Procedures:   Stevan Armendariz is a 30 y.o. female [id number] at 38w0d admitted for IOL 2/2 FGR (AC <10th%) ; received Cook's balloon, Pitocin, AROM (clr). She delivered by spontaneous vaginal a Live Born infant on 23. This patient has no babies on file. Apgars: 8 at 1 minute and 9 at 5 minutes.      Postpartum course: normal.      Course of patient: {UNCOMPLICATED/COMPLICATED:246892794}    Discharge to: {Discharge to:484735721}    Readmission planned: {no/yes:510637}     Recommendations on Discharge:     Medications:      Medication List        ASK your doctor about these medications      aspirin EC 81 MG EC tablet  Take 1 tablet by mouth daily     famotidine 20 MG tablet  Commonly known as: PEPCID  Take 1 tablet by mouth 2 times daily     PRENATAL PO                Activity: pelvic rest x 6 weeks, no driving *** narcotics, ***no lifting greater than 15 lbs  Diet: {diet:89026}  Follow up: {ckfuweek:74705} for {ckfubpanddressin}    Condition on discharge: {condition:80656}    Discharge date: ***    Edmond Aguirre
600 MG tablet  Commonly known as: ADVIL;MOTRIN  Take 1 tablet by mouth every 6 hours as needed for Pain            CONTINUE taking these medications      PRENATAL PO            STOP taking these medications      aspirin EC 81 MG EC tablet     famotidine 20 MG tablet  Commonly known as: PEPCID               Where to Get Your Medications        These medications were sent to [facility] [address] [facility], [address]   [address], [address] [place] [address]      Phone: [phone]   ibuprofen 600 MG tablet           Follow up: 2 weeks with CNMs    Education  - No driving if taking pain medications,  No intercourse, douching or tampons x6 weeks, No swimming, hot tubs or tub baths x6 weeks, No lifting anything heavier than 10 lbs. Report s/s of infection or excessive bleeding to your Doctor. Take medications as prescribed. If not breastfeeding, wear a sports bra and do not let warm water run on your breasts in the shower. Use may apply ice packs to your breasts. Please refer to A New Beginning  book provided in your room. Please take this book home with you to refer to. For Breastfeeding moms, you can contact our lactation specialists with any problems or questions you may have. Phone number [phone] Feel free to leave voice mail and your call will be returned as soon as possible. DIET  Eat a well balanced diet focusing on foods high in fiber and protein. Drink 8-10 glasses of fluids daily, especially water. To avoid constipation you may take a mild stool softener as recommended by your doctor or midwife. ACTIVITY  Gradually increase your activity. Resume exercise regimen only after advise by your doctor or midwife. Avoid lifting anything heavier than your baby or a gallon of milk for SIX weeks. Avoid driving 1 week for vaginal delivery and 2 weeks for  section, unless otherwise instructed by physician.   Rise slowly from a lying to sitting and

## 2023-04-21 NOTE — CARE COORDINATION
Social Work     Sw reviewed medical record (current active problem list) and tox screens and found no current concerns. Sw spoke with mom briefly to explain Sw role, inquire if any needs or concerns, and provide safe sleep education and discuss. Mom denied any needs or questions and informs baby has a safe sleep environment ([place]). Mom denied any current s/s of anxiety or depression and is aware to reach out to OB if any s/s occur after dc. Mom reports a really good support system, fob present and supportive, and mom states many family members, and denied any current questions or needs. Mom reports this is her 4th child, and fob also has 2 children ( 13, 15, 15, 6, 9, 4) Mom states all children excited for baby. Mom states ped will be [employer]. Sw encouraged parents to reach out if any issues or concerns arise.

## 2023-04-21 NOTE — CARE COORDINATION
CASE MANAGEMENT POST-PARTUM TRANSITIONAL CARE PLAN    38 weeks gestation of pregnancy [Z3A.38]    OB Provider: mmw    Writer met w/ Doris at her bedside to discuss DCP. She is S/P  on 23 @ 38w0d at 2204 of female infant    Writer verified address/phone number correct on facesheet. She states she lives with her  and up to 8 other children between their children together and from previous relationships. Doris verbalized no difficulties with transportation to and from doctors appointments or with paying for medications upon discharge home. [place] Medicaid insurance correct. Writer notified Sedonia Locus she has 30 days from date of birth to add  to insurance policy by contacting [address] [place] [address]. She verbalized understanding. Doris confirmed a safe place for infant to sleep at home. Infant name on BC: Cali vela. Infant  [address].      DME: None  HOME CARE: none    Anticipate DC home in private vehicle of couplet 23    Readmission Risk              Risk of Unplanned Readmission:  4

## 2023-04-21 NOTE — PROGRESS NOTES
[facility] LABOR & DELIVERY  [address] [address]  Dept: [phone]  Loc: [phone]  LABOR PROGRESS NOTE      Patient Name: David Parmar  Patient : 1985  Room/Bed: 6405/6145-88  Admission Date/Time: 2023 11:31 AM  MRN #: [id number]  [facility] #: [id number]    Date: 2023  Time: 8:29 PM    The patient was seen and examined. Her pain is well controlled. She is breathing through contractions. She reports fetal movement is present, complains of contractions, denies loss of fluid, denies vaginal bleeding. Denies s/s of preeclampsia including headache vision changes, difficulty breathing, chest pain, RUQ pain or worsening swelling of extremities. Vital Signs:  VS:  oral temperature is 98.2 °F (36.8 °C). Her blood pressure is 122/63 and her pulse is 83. Her respiration is 16 and oxygen saturation is 98%.      FHT:    Baseline: 150   Variability: moderate              Accels: present   Decels: Absent    Scalp electrode: No    Contraction pattern:                                  Frequency: q3min                                 Duration: 60-70 sec                                 Intensity: firm                                 Pitocin: 6 milliunits                                 IUPC:  No  Chaperone: Raphael Bending RN  Cervix:             Dilation: 5cm/ asyncliic            Effacement: 70            Station: -1            Position: vtx, AMY            Consistency: soft, bloody show present    Status of membranes: intact    Pain control: coping well    Maternal status (hydration, fatigue, voids): WNL    Interventions: AROM    Assessment/Plan:  David Parmar is a 30 y.o. female [id number] at 38w0d admitted for IOL due to IUGR/ oligohydramnios   - cEFM/ TOCO  - GBS negative,    - VSS, Afebrile    - IVF LR @ 125 ml/hr    - Induction method: cooks & pitocin   - Diet: clears   - Category 1 strip    Fetal Growth Restriction (with prior history of FGR)  - Reported as less
CLINICAL PHARMACY NOTE: MEDS TO BEDS    Total # of Prescriptions Filled: 1   The following medications were delivered to the patient:  ibuprofen    Additional Documentation:
[place] Sensing balloon placed by Dr Virgil Rand without difficulty and tolerated well by patient  Balloon inflated to 40/40 mL and will follow with 40/40 mL in 20 minutes  FHR stable during procedure  Will start low-dose Pitocin
Labor Progress Note    Bonnie Duran is a 89 [address]y y.o. female [id number] at 38w0d  The patient was seen and examined. Her pain is well controlled. She reports fetal movement is present, denies contractions, denies loss of fluid, denies vaginal bleeding. Cook's balloon placed and well tolerated by patient and fetus. Vital Signs:  Vitals:    04/20/23 1147   BP: 127/76   Pulse: 84   Resp: 16   Temp: 97.9 °F (36.6 °C)   TempSrc: Oral   SpO2: 98%         FHT:  125 , moderate variability, accelerations present, decelerations absent  Contractions: absent    Chaperone for Intimate Exam: Chaperone was present for entire exam, Chaperone Name: BRAYAN Connelly  Cervical Exam: 2 cm dilated, 60 effaced, -1 station  Pitocin: @ 0 mu/min    Membranes: Intact  Scalp Electrode in place: absent  Intrauterine Pressure Catheter in Place: absent    Assessment/Plan:  Bonnie Duran is a 89 [address] [employer] Cswy y.o. female [id number] at 38w0d admitted for IOL 2/2 FGR (AC <10th%)   - GBS negative, No indication for GBS prophylaxis   - SVE: 2,60, -1   - Cooks balloon placed without difficulty   - Low dose pitocin ordered   - Continue current management    Eunice TORRES at bedside.     Jennifer Ruiz DO  Ob/Gyn Resident  4/20/2023, 1:26 PM
[facility]  Labor Note      S/O:  \"Balloon fell out and feeling less intense contractions\"    Cook self-expelled  SVE is stretchy very soft/4-5 cm/50%/-2 st with bloody show  FHR baseline 135 with moderate variability, accelerations present, early decelerations, periodic in timing  Pitocin at 4 units  Reanna every 4 min apart, mild to moderate to palpation  Using ball with relief    A/P:  Induction of labor for FGR/Oligo  - s/p Cook balloon  - Will continue Pitocin at current dose and discontinue with further dilation; anticipating rapid dilation  - Support provided regarding mental anxiety with induction, continue reassurance  - Encourage position changes  FHR Category 1  - Early decelerations
[facility]  Labor Note    SUBJECTIVE:    Clark Borjas is working with contractions, is feeling them now. Support system at bedside    OBJECTIVE:     VS:  oral temperature is 98.2 °F (36.8 °C). Her blood pressure is 110/69 and her pulse is 76. Her respiration is 16 and oxygen saturation is 98%.      FHT:    Baseline: 145   Variability: moderate              Accels: present   Decels: Variable: episodic    Contraction pattern:                                  Frequency: Irregular, 2-4 min aprt                                 Duration: 40-60 sec                                 Intensity: Mild                                 Pitocin: 4 units                               Cervix: Deferred/Cook balloon in place    Status of membranes: Intact    Pain control: None needed, breathing with contractions    Maternal status (hydration, fatigue, voids):   Remains in good spirits, some anxiety  IV infusing; taking PO fluids and solid and is voiding QS       ASSESSMENT/PLAN:  Induction of labor for FGR/Oligo  - Cook balloon in place, removal in 6/12 hr (0130)  - Encouraged OOB as needed  FHR Category 1  - Discussed monitoring of FHR for potential decelerations as well as amnioinfusion if needed
w/ medication    Breasts:  Soft without tenderness, nipples are intact    Abdominal Exam: Soft, non-tender, lax muscle tone                                Fundus is mid-line, firm @ U/-1. Non-tender with palpation. Bladder non-distended    Perineum: not inspected    Lochia: Small and Rubra    Extremities: Negative Lyssa sign. no edema     Voiding QS, -BM    Labs:   Lab Results   Component Value Date/Time    HGB 11.9 2023 12:49 PM    HCT 36.8 2023 12:49 PM       Assessment/Plan:  Joaquin Howe is a [id number] PPD # 1 s/p         - Doing well, VSS               - female infant in [place] [address] Nursery   -  Encourage ambulation   - Motrin/Tylenol PRN  - Discharge planned for today  - RTO in 2 weeks  -  Counseling Completed: Home care, Follow up Care, and birth control review completed. Secondary Smoke risks and Sudden Infant Death Syndrome were reviewed with recommendations. Signs and Symptoms of Post Partum Depression were reviewed. The patient is to call if any occur. Signs and symptoms of Mastitis were reviewed. The patient is to call if any occur for follow up. 2. Breast feeding without difficulty  - Breast care reviewed    3.  Rh +/ rubella immune    Attending: Dr. Tammy Rich    Electronically signed by ANGELO Tyson CNM on 2023 at 12:58 PM
on C/ Benignovicente Collazo 79 on 2/1/23      BMI 28  Patient Active Problem List    Diagnosis Date Noted    Fetal growth restriction antepartum 03/29/2023     Priority: High     3/23/23 Growth U/S in office 4#7 AC 2.1%, SHARMILA 12.35 cm - [facility] to [facility] for [facility]  MFM 3/30/23: AC 16%, appropriate fetal growth, repeat at 38 wk      DECLINES Tdap 02/16/2023     Priority: Medium    HRP (high risk pregnancy), third trimester 12/29/2022     Priority: Medium     Transfer from SOB to MMW at 29 weeks for midwifery care  Hx home birth in [place]  NIPT low risk  Chart reviewed Dr Jorge Cole 3/5/23      Hx IUGR 12/29/2022     Priority: Medium     2/1/23 EFW 49% (AC 32%)  Rpt U/S - order in place to complete in office      AMA 11/23/2022     Priority: Medium     On ASA 81 mg      Umbilical hernia 11/24/1671     Priority: Medium    38 weeks gestation of pregnancy 04/20/2023    RESOLVED - Complete Placenta Previa 12/29/2022     Noted on MFM scan 12/21/22  Resolved 2/1/23      FHx VTE 12/21/2022     DVT - mother   MFM ordered thrombophilia w/u      Family history of spina bifida 09/30/2022     Half sister- had surgery at birth and at 9 yrs old, is ambulatory and has children   AFP negative        Plan discussed with Anthony Bernard, who is agreeable. Risks, benefits, alternatives and possible complications have been discussed in detail with the patient. Admission, and post admission procedures and expectations were discussed in detail. All questions were answered.     Attending's Name: Dr. Yadiel Alberts (In-house attending)    Sharyn Andino MD  Ob/Gyn Resident  4/20/2023, 12:48 PM

## 2023-04-21 NOTE — L&D DELIVERY NOTE
infused intravenously after delivery of the . The delivery of the placenta was spontaneous. The perineum and vagina were explored and   no   laceration was found. Cervix intact. Uterus firm. Bleeding has good hemostasis upon leaving the room. Mom and  are stable. Viable  female  Amniotic Fluid: clear  Fetal Delivery Position: AMY  Maternal Birthing position: supine  Nuchal cord: none    Dr. Ashley Dudley performed delivery under direct supervision.        ANGELO Elizabeth CNM  Midwife  2023, 11:31 PM

## 2023-04-21 NOTE — PLAN OF CARE
Problem: Vaginal Birth or  Section  Goal: Fetal and maternal status remain reassuring during the birth process  Description:  Birth OB-Pregnancy care plan goal which identifies if the fetal and maternal status remain reassuring during the birth process  Outcome: Progressing     Problem: Pain  Goal: Verbalizes/displays adequate comfort level or baseline comfort level  Outcome: Progressing     Problem: Infection - Adult  Goal: Absence of infection at discharge  Outcome: Progressing  Goal: Absence of infection during hospitalization  Outcome: Progressing  Goal: Absence of fever/infection during anticipated neutropenic period  Outcome: Progressing     Problem: Safety - Adult  Goal: Free from fall injury  Outcome: Progressing
improved  Outcome: Progressing     Problem: Vaginal Birth or  Section  Goal: Fetal and maternal status remain reassuring during the birth process  Description:  Birth OB-Pregnancy care plan goal which identifies if the fetal and maternal status remain reassuring during the birth process  2023 0057 by Osvaldo Streeter RN  Outcome: Completed  2023 1209 by Haseeb Allen RN  Outcome: Progressing

## 2023-04-21 NOTE — CONSULTS
Lactation round made. Mother reports breastfeeding is going well. Mother has pumped and nursed in the past with no problems. Mother has 2 pumps. Mother denies questions and assistance at this time. Encouraged to call out. Packet given.

## 2023-05-26 ENCOUNTER — POSTPARTUM VISIT (OUTPATIENT)
Dept: OBGYN CLINIC | Age: 38
End: 2023-05-26

## 2023-05-26 VITALS — DIASTOLIC BLOOD PRESSURE: 80 MMHG | SYSTOLIC BLOOD PRESSURE: 118 MMHG | WEIGHT: 176 LBS | BODY MASS INDEX: 26.76 KG/M2

## 2023-05-26 PROBLEM — O09.529 ANTEPARTUM MULTIGRAVIDA OF ADVANCED MATERNAL AGE: Status: RESOLVED | Noted: 2022-11-23 | Resolved: 2023-05-26

## 2023-05-26 PROBLEM — O36.5990 FETAL GROWTH RESTRICTION ANTEPARTUM: Status: RESOLVED | Noted: 2023-03-29 | Resolved: 2023-05-26

## 2023-05-26 PROBLEM — O36.5990 IUGR (INTRAUTERINE GROWTH RESTRICTION) AFFECTING CARE OF MOTHER: Status: RESOLVED | Noted: 2022-12-29 | Resolved: 2023-05-26

## 2023-05-26 PROBLEM — O09.93 HRP (HIGH RISK PREGNANCY), THIRD TRIMESTER: Status: RESOLVED | Noted: 2022-12-29 | Resolved: 2023-05-26

## 2023-05-26 NOTE — PROGRESS NOTES
Chief Complaint   Patient presents with    Postpartum Care       HPI:  DELIVERY DATE: 23  TYPE OF DELIVERY: normal spontaneous vaginal delivery  PROVIDER: JUANA  PERINEUM: cristopher    Doirs was seen for her 6 week visit. Her Female infant is healthy. She is breast feeding. She is breastfeeding without difficulty. She is experiencing pain. She reports her lochia is no bleeding  She reports none perineal discomfort. She denies urinary incontinence. Her bowels have returned to her normal pattern. She is back to her normal activity pattern. She has not her first menses. Doris has not engaged in intercourse. She does report a mood disorder. She feels she is not having difficulty coping. Katya Rosales feels her family adjustment is effective. She reports an overall positive birth experience. Her [place] Score is 0. This score does match my assessment. OBJECTIVE:   Wt Readings from Last 3 Encounters:   23 176 lb (79.8 kg)   23 186 lb (84.4 kg)   23 185 lb (83.9 kg)       Blood pressure 118/80, weight 176 lb (79.8 kg), last menstrual period 2022, currently breastfeeding. Breast exam: Normal to inspection    Abdomen: Soft non-tender without guarding. There is not diastasis present. The diastasis is 0 finger breaths of separation. Perineum: not inspected,     Extremities: No calf tenderness bilaterally. No edema. ASSESSMENT/PLAN:    6 week postpartum visit  She will return for annual  Labs were not ordered.  Date of last pap:  breast feeding  Signs & Symptoms of mastitis reviewed; notify if occurs  Smoker/non-smoker:  Secondary smoke risks were reviewed, including increased risks of respiratory problems, Sudden Infant Death Syndrome, and potential malignancies. Smoking cessation counseling:  no  Family planning needs  Family planning counseling was completed.    Adverse outcomes/Arteriosclerotic Cardiovascular Disease Screen (ASCVD):   Delivery: no  GDM:

## 2023-06-02 ENCOUNTER — OFFICE VISIT (OUTPATIENT)
Dept: FAMILY MEDICINE CLINIC | Age: 38
End: 2023-06-02
Payer: MEDICAID

## 2023-06-02 VITALS
WEIGHT: 175 LBS | TEMPERATURE: 98.1 F | OXYGEN SATURATION: 98 % | DIASTOLIC BLOOD PRESSURE: 62 MMHG | SYSTOLIC BLOOD PRESSURE: 100 MMHG | HEART RATE: 75 BPM | BODY MASS INDEX: 26.52 KG/M2 | HEIGHT: 68 IN

## 2023-06-02 DIAGNOSIS — Z13.1 SCREENING FOR DIABETES MELLITUS: ICD-10-CM

## 2023-06-02 DIAGNOSIS — R09.81 SINUS CONGESTION: ICD-10-CM

## 2023-06-02 DIAGNOSIS — R53.83 FATIGUE, UNSPECIFIED TYPE: ICD-10-CM

## 2023-06-02 DIAGNOSIS — Z00.00 ROUTINE ADULT HEALTH MAINTENANCE: ICD-10-CM

## 2023-06-02 DIAGNOSIS — Z11.59 NEED FOR HEPATITIS C SCREENING TEST: ICD-10-CM

## 2023-06-02 DIAGNOSIS — Z76.89 ENCOUNTER TO ESTABLISH CARE: Primary | ICD-10-CM

## 2023-06-02 PROCEDURE — 99204 OFFICE O/P NEW MOD 45 MIN: CPT | Performed by: STUDENT IN AN ORGANIZED HEALTH CARE EDUCATION/TRAINING PROGRAM

## 2023-06-02 RX ORDER — AMOXICILLIN AND CLAVULANATE POTASSIUM 875; 125 MG/1; MG/1
1 TABLET, FILM COATED ORAL 2 TIMES DAILY
Qty: 10 TABLET | Refills: 0 | Status: SHIPPED | OUTPATIENT
Start: 2023-06-02 | End: 2023-06-07

## 2023-06-02 SDOH — ECONOMIC STABILITY: TRANSPORTATION INSECURITY
IN THE PAST 12 MONTHS, HAS THE LACK OF TRANSPORTATION KEPT YOU FROM MEDICAL APPOINTMENTS OR FROM GETTING MEDICATIONS?: NO

## 2023-06-02 SDOH — ECONOMIC STABILITY: INCOME INSECURITY: IN THE LAST 12 MONTHS, WAS THERE A TIME WHEN YOU WERE NOT ABLE TO PAY THE MORTGAGE OR RENT ON TIME?: NO

## 2023-06-02 SDOH — ECONOMIC STABILITY: FOOD INSECURITY: WITHIN THE PAST 12 MONTHS, THE FOOD YOU BOUGHT JUST DIDN'T LAST AND YOU DIDN'T HAVE MONEY TO GET MORE.: NEVER TRUE

## 2023-06-02 SDOH — ECONOMIC STABILITY: HOUSING INSECURITY
IN THE LAST 12 MONTHS, WAS THERE A TIME WHEN YOU DID NOT HAVE A STEADY PLACE TO SLEEP OR SLEPT IN A SHELTER (INCLUDING NOW)?: NO

## 2023-06-02 SDOH — ECONOMIC STABILITY: FOOD INSECURITY: WITHIN THE PAST 12 MONTHS, YOU WORRIED THAT YOUR FOOD WOULD RUN OUT BEFORE YOU GOT MONEY TO BUY MORE.: NEVER TRUE

## 2023-06-02 SDOH — ECONOMIC STABILITY: TRANSPORTATION INSECURITY
IN THE PAST 12 MONTHS, HAS LACK OF TRANSPORTATION KEPT YOU FROM MEETINGS, WORK, OR FROM GETTING THINGS NEEDED FOR DAILY LIVING?: NO

## 2023-06-02 ASSESSMENT — PATIENT HEALTH QUESTIONNAIRE - PHQ9
SUM OF ALL RESPONSES TO PHQ9 QUESTIONS 1 & 2: 0
1. LITTLE INTEREST OR PLEASURE IN DOING THINGS: 0
SUM OF ALL RESPONSES TO PHQ QUESTIONS 1-9: 0
SUM OF ALL RESPONSES TO PHQ QUESTIONS 1-9: 0
2. FEELING DOWN, DEPRESSED OR HOPELESS: 0
SUM OF ALL RESPONSES TO PHQ QUESTIONS 1-9: 0
SUM OF ALL RESPONSES TO PHQ QUESTIONS 1-9: 0

## 2023-06-02 ASSESSMENT — SOCIAL DETERMINANTS OF HEALTH (SDOH): HOW HARD IS IT FOR YOU TO PAY FOR THE VERY BASICS LIKE FOOD, HOUSING, MEDICAL CARE, AND HEATING?: NOT HARD AT ALL

## 2023-06-05 ASSESSMENT — ENCOUNTER SYMPTOMS
COUGH: 0
NAUSEA: 0
ABDOMINAL PAIN: 0
SHORTNESS OF BREATH: 0
WHEEZING: 0
EYE DISCHARGE: 0
SORE THROAT: 1
SINUS PRESSURE: 1
VOMITING: 0
CHEST TIGHTNESS: 0
DIARRHEA: 0
CONSTIPATION: 0

## 2024-07-22 ENCOUNTER — OFFICE VISIT (OUTPATIENT)
Dept: OBGYN CLINIC | Age: 39
End: 2024-07-22
Payer: MEDICAID

## 2024-07-22 ENCOUNTER — HOSPITAL ENCOUNTER (OUTPATIENT)
Age: 39
Setting detail: SPECIMEN
Discharge: HOME | End: 2024-07-22

## 2024-07-22 VITALS
DIASTOLIC BLOOD PRESSURE: 68 MMHG | WEIGHT: 154.5 LBS | HEART RATE: 77 BPM | HEIGHT: 68 IN | BODY MASS INDEX: 23.42 KG/M2 | SYSTOLIC BLOOD PRESSURE: 110 MMHG

## 2024-07-22 DIAGNOSIS — N89.8 VAGINAL ITCHING: ICD-10-CM

## 2024-07-22 DIAGNOSIS — N30.00 ACUTE CYSTITIS WITHOUT HEMATURIA: ICD-10-CM

## 2024-07-22 DIAGNOSIS — Z87.440 HISTORY OF UTI: ICD-10-CM

## 2024-07-22 DIAGNOSIS — N30.00 ACUTE CYSTITIS WITHOUT HEMATURIA: Primary | ICD-10-CM

## 2024-07-22 LAB
BILIRUBIN, POC: NEGATIVE
BLOOD URINE, POC: NEGATIVE
CLARITY, POC: NORMAL
COLOR, POC: YELLOW
GLUCOSE URINE, POC: NEGATIVE
KETONES, POC: NORMAL
LEUKOCYTE EST, POC: NEGATIVE
NITRITE, POC: POSITIVE
PH, POC: 6.5
PROTEIN, POC: NEGATIVE
SPECIFIC GRAVITY, POC: 1.02
UROBILINOGEN, POC: 1

## 2024-07-22 PROCEDURE — 81003 URINALYSIS AUTO W/O SCOPE: CPT | Performed by: ADVANCED PRACTICE MIDWIFE

## 2024-07-22 PROCEDURE — 99214 OFFICE O/P EST MOD 30 MIN: CPT | Performed by: ADVANCED PRACTICE MIDWIFE

## 2024-07-22 RX ORDER — CEPHALEXIN 500 MG/1
500 CAPSULE ORAL 3 TIMES DAILY
Qty: 21 CAPSULE | Refills: 0 | Status: SHIPPED | OUTPATIENT
Start: 2024-07-22 | End: 2024-07-29

## 2024-07-22 RX ORDER — FLUCONAZOLE 150 MG/1
150 TABLET ORAL
Qty: 2 TABLET | Refills: 0 | Status: SHIPPED | OUTPATIENT
Start: 2024-07-22

## 2024-07-22 ASSESSMENT — ENCOUNTER SYMPTOMS
GASTROINTESTINAL NEGATIVE: 1
EYES NEGATIVE: 1
ALLERGIC/IMMUNOLOGIC NEGATIVE: 1
RESPIRATORY NEGATIVE: 1

## 2024-07-22 NOTE — PROGRESS NOTES
Patient is here for vaginal itching, vaginal discharge, vaginal odor. Patient reports having some pelvic pain, dark urine, foul smell to her urine.     Patient stated she recently completed a round of amoxicillin.   
is not in acute distress.     Appearance: Normal appearance. She is normal weight. She is not ill-appearing, toxic-appearing or diaphoretic.   Cardiovascular:      Rate and Rhythm: Normal rate.   Pulmonary:      Effort: Pulmonary effort is normal. No respiratory distress.   Genitourinary:     Comments: Shared decision making - deferred  Skin:     General: Skin is warm and dry.   Neurological:      Mental Status: She is alert and oriented to person, place, and time. Mental status is at baseline.   Psychiatric:         Mood and Affect: Mood normal.         Behavior: Behavior normal.         Thought Content: Thought content normal.         Judgment: Judgment normal.        Assessment/Plan:      Acute cystitis without hematuria  -     POCT Urinalysis No Micro (Auto): positive nitrites   -     Culture, Urine; Future  -     cephALEXin (KEFLEX) 500 MG capsule; Take 1 capsule by mouth 3 times daily for 7 days  -      Notify office if no improvement in 48-72 hours  -      Will change abx based on culture if indicated    History of UTI  -     Culture, Urine; Future    Vaginal itching  -     Vaginitis DNA Probe; Future  -     fluconazole (DIFLUCAN) 150 MG tablet; Take 1 tablet by mouth every 3 days      Return if symptoms worsen or fail to improve.    Problem list reviewed and updated as indicated.   Upon completion of the visit all questions were answered.  History was reviewed as documented on Epic Navigator.    The patient, Doris Ceron,  was seen with a total time spent of 31 minutes for the visit on this date [facility]  The time component involved both face-to-face (counseling and education) and non face-to-face time (care coordination), spent in determining the total time component.

## 2024-07-23 LAB
CANDIDA SPECIES: POSITIVE
GARDNERELLA VAGINALIS: NEGATIVE
SOURCE: ABNORMAL
TRICHOMONAS: NEGATIVE

## 2024-07-25 LAB
MICROORGANISM SPEC CULT: ABNORMAL
SERVICE CMNT-IMP: ABNORMAL
SPECIMEN DESCRIPTION: ABNORMAL

## 2024-12-20 ENCOUNTER — OFFICE VISIT (OUTPATIENT)
Dept: FAMILY MEDICINE CLINIC | Age: 39
End: 2024-12-20
Payer: MEDICAID

## 2024-12-20 VITALS
SYSTOLIC BLOOD PRESSURE: 104 MMHG | BODY MASS INDEX: 24.71 KG/M2 | OXYGEN SATURATION: 100 % | HEART RATE: 75 BPM | WEIGHT: 163 LBS | DIASTOLIC BLOOD PRESSURE: 76 MMHG | HEIGHT: 68 IN

## 2024-12-20 DIAGNOSIS — M13.0 POLYARTHRITIS: ICD-10-CM

## 2024-12-20 DIAGNOSIS — E53.8 VITAMIN B12 DEFICIENCY: ICD-10-CM

## 2024-12-20 DIAGNOSIS — Z13.0 SCREENING FOR IRON DEFICIENCY ANEMIA: ICD-10-CM

## 2024-12-20 DIAGNOSIS — G89.29 CHRONIC HAND PAIN, UNSPECIFIED LATERALITY: Primary | ICD-10-CM

## 2024-12-20 DIAGNOSIS — Z76.89 ESTABLISHING CARE WITH NEW DOCTOR, ENCOUNTER FOR: ICD-10-CM

## 2024-12-20 DIAGNOSIS — Z13.6 SCREENING FOR CARDIOVASCULAR CONDITION: ICD-10-CM

## 2024-12-20 DIAGNOSIS — Z13.29 THYROID DISORDER SCREEN: ICD-10-CM

## 2024-12-20 DIAGNOSIS — Z13.220 SCREENING FOR HYPERLIPIDEMIA: ICD-10-CM

## 2024-12-20 DIAGNOSIS — Z86.39 HISTORY OF HYPERGLYCEMIA: ICD-10-CM

## 2024-12-20 DIAGNOSIS — E55.9 VITAMIN D DEFICIENCY: ICD-10-CM

## 2024-12-20 DIAGNOSIS — M79.643 CHRONIC HAND PAIN, UNSPECIFIED LATERALITY: Primary | ICD-10-CM

## 2024-12-20 PROCEDURE — 99204 OFFICE O/P NEW MOD 45 MIN: CPT

## 2024-12-20 SDOH — ECONOMIC STABILITY: INCOME INSECURITY: HOW HARD IS IT FOR YOU TO PAY FOR THE VERY BASICS LIKE FOOD, HOUSING, MEDICAL CARE, AND HEATING?: NOT HARD AT ALL

## 2024-12-20 SDOH — ECONOMIC STABILITY: FOOD INSECURITY: WITHIN THE PAST 12 MONTHS, THE FOOD YOU BOUGHT JUST DIDN'T LAST AND YOU DIDN'T HAVE MONEY TO GET MORE.: NEVER TRUE

## 2024-12-20 SDOH — ECONOMIC STABILITY: FOOD INSECURITY: WITHIN THE PAST 12 MONTHS, YOU WORRIED THAT YOUR FOOD WOULD RUN OUT BEFORE YOU GOT MONEY TO BUY MORE.: NEVER TRUE

## 2024-12-20 SDOH — HEALTH STABILITY: PHYSICAL HEALTH: ON AVERAGE, HOW MANY MINUTES DO YOU ENGAGE IN EXERCISE AT THIS LEVEL?: 20 MIN

## 2024-12-20 SDOH — HEALTH STABILITY: PHYSICAL HEALTH: ON AVERAGE, HOW MANY DAYS PER WEEK DO YOU ENGAGE IN MODERATE TO STRENUOUS EXERCISE (LIKE A BRISK WALK)?: 2 DAYS

## 2024-12-20 ASSESSMENT — PATIENT HEALTH QUESTIONNAIRE - PHQ9
SUM OF ALL RESPONSES TO PHQ QUESTIONS 1-9: 0
2. FEELING DOWN, DEPRESSED OR HOPELESS: NOT AT ALL
SUM OF ALL RESPONSES TO PHQ QUESTIONS 1-9: 0
SUM OF ALL RESPONSES TO PHQ QUESTIONS 1-9: 0
SUM OF ALL RESPONSES TO PHQ9 QUESTIONS 1 & 2: 0
SUM OF ALL RESPONSES TO PHQ QUESTIONS 1-9: 0
1. LITTLE INTEREST OR PLEASURE IN DOING THINGS: NOT AT ALL

## 2024-12-20 NOTE — PROGRESS NOTES
Reflex     Standing Status:   Future     Standing Expiration Date:   12/20/2025    C-Reactive Protein     Standing Status:   Future     Standing Expiration Date:   12/20/2025    Cyclic Citrul Peptide Antibody, IgG     Standing Status:   Future     Standing Expiration Date:   12/20/2025    Rheumatoid Factor     Standing Status:   Future     Standing Expiration Date:   12/20/2025    Bakari Schwartz MD, Orthopaedic Surgery, [place]     Referral Priority:   Routine     Referral Type:   Eval and Treat     Referral Reason:   Specialty Services Required     Referred to Provider:   Bakari Smith MD     Requested Specialty:   Orthopedic Surgery     Number of Visits Requested:   1    EMG     If abnormalities are found in the ordered extremity(ies) that indicate a broader electro-diagnostic problem than initially suspected, the electro-diagnostician may complete additional testing of extremities not previously ordered to allow a definitive conclusion to the EMG study     Standing Status:   Future     Standing Expiration Date:   2/18/2025     Order Specific Question:   Which body part?     Answer:   Right Upper Extremity     Order Specific Question:   Which body part?     Answer:   Left Upper Extremity     Order Specific Question:   Reason for exam     Answer:   Hand weakness     No orders of the defined types were placed in this encounter.      The patient (or guardian, if applicable) and other individuals in attendance with the patient were advised that Artificial Intelligence will be utilized during this visit to record, process the conversation to generate a clinical note, and support improvement of the AI technology. The patient (or guardian, if applicable) and other individuals in attendance at the appointment consented to the use of AI, including the recording.      Reviewed health maintenance, prior labs and imaging.   Patient given educational materials - see patient instructions.    Discussed

## 2025-01-13 ENCOUNTER — HOSPITAL ENCOUNTER (OUTPATIENT)
Dept: GENERAL RADIOLOGY | Age: 40
Discharge: HOME | End: 2025-01-15
Payer: MEDICAID

## 2025-01-13 ENCOUNTER — HOSPITAL ENCOUNTER (OUTPATIENT)
Age: 40
Discharge: HOME | End: 2025-01-15
Payer: MEDICAID

## 2025-01-13 ENCOUNTER — HOSPITAL ENCOUNTER (OUTPATIENT)
Age: 40
Setting detail: SPECIMEN
Discharge: HOME | End: 2025-01-13

## 2025-01-13 DIAGNOSIS — Z13.6 SCREENING FOR CARDIOVASCULAR CONDITION: ICD-10-CM

## 2025-01-13 DIAGNOSIS — Z86.39 HISTORY OF HYPERGLYCEMIA: ICD-10-CM

## 2025-01-13 DIAGNOSIS — G89.29 CHRONIC HAND PAIN, UNSPECIFIED LATERALITY: ICD-10-CM

## 2025-01-13 DIAGNOSIS — Z13.220 SCREENING FOR HYPERLIPIDEMIA: ICD-10-CM

## 2025-01-13 DIAGNOSIS — E53.8 VITAMIN B12 DEFICIENCY: ICD-10-CM

## 2025-01-13 DIAGNOSIS — M79.643 CHRONIC HAND PAIN, UNSPECIFIED LATERALITY: ICD-10-CM

## 2025-01-13 DIAGNOSIS — M13.0 POLYARTHRITIS: ICD-10-CM

## 2025-01-13 DIAGNOSIS — E55.9 VITAMIN D DEFICIENCY: ICD-10-CM

## 2025-01-13 DIAGNOSIS — Z13.29 THYROID DISORDER SCREEN: ICD-10-CM

## 2025-01-13 DIAGNOSIS — Z13.0 SCREENING FOR IRON DEFICIENCY ANEMIA: ICD-10-CM

## 2025-01-13 LAB
25(OH)D3 SERPL-MCNC: 18 NG/ML (ref 30–100)
ALBUMIN SERPL-MCNC: 4.3 G/DL (ref 3.5–5.2)
ALBUMIN/GLOB SERPL: 1.5 {RATIO} (ref 1–2.5)
ALP SERPL-CCNC: 54 U/L (ref 35–104)
ALT SERPL-CCNC: 18 U/L (ref 10–35)
ANION GAP SERPL CALCULATED.3IONS-SCNC: 7 MMOL/L (ref 9–16)
AST SERPL-CCNC: 20 U/L (ref 10–35)
BASOPHILS # BLD: 0.05 K/UL (ref 0–0.2)
BASOPHILS NFR BLD: 1 % (ref 0–2)
BILIRUB SERPL-MCNC: 0.5 MG/DL (ref 0–1.2)
BUN SERPL-MCNC: 9 MG/DL (ref 6–20)
CALCIUM SERPL-MCNC: 9.4 MG/DL (ref 8.6–10.4)
CHLORIDE SERPL-SCNC: 106 MMOL/L (ref 98–107)
CHOLEST SERPL-MCNC: 153 MG/DL (ref 0–199)
CHOLESTEROL/HDL RATIO: 3
CO2 SERPL-SCNC: 27 MMOL/L (ref 20–31)
CREAT SERPL-MCNC: 0.8 MG/DL (ref 0.6–0.9)
CRP SERPL HS-MCNC: <3 MG/L (ref 0–5)
EOSINOPHIL # BLD: 0.31 K/UL (ref 0–0.44)
EOSINOPHILS RELATIVE PERCENT: 7 % (ref 1–4)
ERYTHROCYTE [DISTWIDTH] IN BLOOD BY AUTOMATED COUNT: 13.2 % (ref 11.8–14.4)
EST. AVERAGE GLUCOSE BLD GHB EST-MCNC: 85 MG/DL
GFR, ESTIMATED: >90 ML/MIN/1.73M2
GLUCOSE SERPL-MCNC: 86 MG/DL (ref 74–99)
HBA1C MFR BLD: 4.6 % (ref 4–6)
HCT VFR BLD AUTO: 34.5 % (ref 36.3–47.1)
HDLC SERPL-MCNC: 51 MG/DL
HGB BLD-MCNC: 10.6 G/DL (ref 11.9–15.1)
IMM GRANULOCYTES # BLD AUTO: <0.03 K/UL (ref 0–0.3)
IMM GRANULOCYTES NFR BLD: 0 %
LDLC SERPL CALC-MCNC: 88 MG/DL (ref 0–100)
LYMPHOCYTES NFR BLD: 1.1 K/UL (ref 1.1–3.7)
LYMPHOCYTES RELATIVE PERCENT: 23 % (ref 24–43)
MCH RBC QN AUTO: 30 PG (ref 25.2–33.5)
MCHC RBC AUTO-ENTMCNC: 30.7 G/DL (ref 28.4–34.8)
MCV RBC AUTO: 97.7 FL (ref 82.6–102.9)
MONOCYTES NFR BLD: 0.37 K/UL (ref 0.1–1.2)
MONOCYTES NFR BLD: 8 % (ref 3–12)
NEUTROPHILS NFR BLD: 61 % (ref 36–65)
NEUTS SEG NFR BLD: 2.91 K/UL (ref 1.5–8.1)
NRBC BLD-RTO: 0 PER 100 WBC
PLATELET # BLD AUTO: 294 K/UL (ref 138–453)
PMV BLD AUTO: 10.7 FL (ref 8.1–13.5)
POTASSIUM SERPL-SCNC: 4.6 MMOL/L (ref 3.7–5.3)
PROT SERPL-MCNC: 7.1 G/DL (ref 6.6–8.7)
RBC # BLD AUTO: 3.53 M/UL (ref 3.95–5.11)
RHEUMATOID FACT SER NEPH-ACNC: <10 IU/ML (ref 0–13)
SODIUM SERPL-SCNC: 140 MMOL/L (ref 136–145)
TRIGL SERPL-MCNC: 68 MG/DL (ref 0–149)
TSH SERPL DL<=0.05 MIU/L-ACNC: 1.6 UIU/ML (ref 0.27–4.2)
VIT B12 SERPL-MCNC: 354 PG/ML (ref 232–1245)
VLDLC SERPL CALC-MCNC: 14 MG/DL (ref 1–30)
WBC OTHER # BLD: 4.8 K/UL (ref 3.5–11.3)

## 2025-01-13 PROCEDURE — 73130 X-RAY EXAM OF HAND: CPT

## 2025-01-14 LAB
ANA SER QL IA: NEGATIVE
CCP AB SER IA-ACNC: 1.5 U/ML (ref 0–7)
DSDNA IGG SER QL IA: 1.6 IU/ML
NUCLEAR IGG SER IA-RTO: 0.3 U/ML

## 2025-01-19 DIAGNOSIS — E55.9 VITAMIN D DEFICIENCY: ICD-10-CM

## 2025-01-19 DIAGNOSIS — R79.89 ABNORMAL CBC: Primary | ICD-10-CM

## 2025-01-19 RX ORDER — ERGOCALCIFEROL 1.25 MG/1
50000 CAPSULE, LIQUID FILLED ORAL WEEKLY
Qty: 12 CAPSULE | Refills: 1 | Status: SHIPPED | OUTPATIENT
Start: 2025-01-19

## 2025-02-04 ENCOUNTER — OFFICE VISIT (OUTPATIENT)
Age: 40
End: 2025-02-04
Payer: MEDICAID

## 2025-02-04 VITALS — HEIGHT: 68 IN | WEIGHT: 163 LBS | BODY MASS INDEX: 24.71 KG/M2

## 2025-02-04 DIAGNOSIS — M25.312 INSTABILITY OF BOTH SHOULDER JOINTS: Primary | ICD-10-CM

## 2025-02-04 DIAGNOSIS — Q79.60 EHLERS-DANLOS SYNDROME: ICD-10-CM

## 2025-02-04 DIAGNOSIS — M25.311 INSTABILITY OF BOTH SHOULDER JOINTS: Primary | ICD-10-CM

## 2025-02-04 PROCEDURE — 99204 OFFICE O/P NEW MOD 45 MIN: CPT | Performed by: ORTHOPAEDIC SURGERY

## 2025-02-04 NOTE — PROGRESS NOTES
[facility] Orthopedics & Sports Medicine      [facility] PHYSICIANS [facility] ORTHOPAEDICS AND SPORTS MEDICINE  [facility]  [place] [address]  Dept: [phone]  Dept Fax: [phone]    Chief Compliant:  Chief Complaint   Patient presents with    Hand Pain     Bilateral hand pain        History of Present Illness:  This is a 39 y.o. female who presents to the clinic today for evaluation / follow up of bilateral wrist and hand pain.  She states that she has noticed this over the last couple years.  She states that it is really just a vague pain with more use of the hands.  It can be over the ulnar aspect of the right wrist but also diffusely over the dorsum of the hands and MCP joints.  She states that she has had lab work drawn which was negative for any significant findings.  She is here today for further evaluation.  Denies any numbness or tingling.  She does state that she has a history of a right shoulder dislocation even after surgical stabilization.  She does state that she has always been very flexible.      Physical Exam:    On examination today both hands are within normal limits.  The skin looks to be healthy there is no effusions she tolerates range of motion very well with wrist flexion extension and all digits.  Incision intact light touch she has good  strength.  She has just very minor tenderness over the right wrist TFCC area.  On exam today she can bring her thumb back to her forearm.    Nursing note and vitals reviewed.     Labs and Imaging: X-rays of both hands were reviewed that do not show any significant process in the hands or the radiocarpal joint.    XR taken today:  No results found.        Orders Placed This Encounter   Procedures    Ambulatory referral to Physical Therapy     Referral Priority:   Routine     Referral Type:   Eval and Treat     Referral Reason:   Specialty Services Required     Requested Specialty:   Physical Therapy

## 2025-02-17 ENCOUNTER — HOSPITAL ENCOUNTER (OUTPATIENT)
Dept: PHYSICAL THERAPY | Facility: CLINIC | Age: 40
Setting detail: THERAPIES SERIES
Discharge: HOME | End: 2025-02-17
Payer: MEDICAID

## 2025-02-17 PROCEDURE — 97161 PT EVAL LOW COMPLEX 20 MIN: CPT

## 2025-02-17 PROCEDURE — 97110 THERAPEUTIC EXERCISES: CPT

## 2025-02-17 NOTE — CONSULTS
[] [facility]  Outpatient Rehabilitation &  Therapy  [address]  [phone]  [phone] [] [facility]  Outpatient Rehabilitation &  Therapy  [address]  P: [phone]  F: [phone] [] [facility]  Outpatient Rehabilitation &  Therapy  [address]  P: [phone]  F: [phone] [x] [facility]  Outpatient Rehabilitation &  Therapy  [address]  [phone]  [phone] [] [facility]  Outpatient Rehabilitation &  Therapy  [address]   P: [phone]  F: [phone]  [] [facility]  Outpatient Rehabilitation &  Therapy  [address]  P: [phone]  F: [phone] [] [facility]  Outpatient Rehabilitation &  Therapy  [address]  P: [phone]  F: [phone] [] [facility]  Outpatient Rehabilitation &  Therapy  [address]  P: [phone]  F: [phone] [] [facility]  Outpatient Rehabilitation &  Therapy  [address]  P: [phone]  F: [phone]  [] [facility] Outpatient Rehabilitation &  Therapy  [address]  P: [phone]  F: [phone]     Physical Therapy Upper Extremity Evaluation    Date:  2025  Patient: Doris Ceron  : 1985  MRN: [id number]  Physician: Bakari Smith MD  Insurance:  [facility] MEDICAID per justin yr, no co-pay, no co-insur, no deduct, no hard max, vs 30 remain, visits not combined, auth req aftr 30 vs   Medical Diagnosis: 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 Pt states she

## 2025-02-21 ENCOUNTER — HOSPITAL ENCOUNTER (OUTPATIENT)
Dept: PHYSICAL THERAPY | Facility: CLINIC | Age: 40
Setting detail: THERAPIES SERIES
Discharge: HOME | End: 2025-02-21
Payer: MEDICAID

## 2025-02-21 NOTE — FLOWSHEET NOTE
[] [facility]  Outpatient Rehabilitation &  Therapy  [address]  [phone]  [phone] [] [facility]  Outpatient Rehabilitation &  Therapy  [address]  P: [phone]  F: [phone] [] [facility]  Outpatient Rehabilitation &  Therapy  [address]  P: [phone]  F: [phone] [x] [facility]  Outpatient Rehabilitation &  Therapy  [address]  [phone]  [phone] [] [facility]  Outpatient Rehabilitation &  Therapy  [address]   P: [phone]  F: [phone]  [] [facility]  Outpatient Rehabilitation &  Therapy  [address]  P: [phone]  F: [phone] [] [facility]  Outpatient Rehabilitation &  Therapy  [address]  P: [phone]  F: [phone] [] [facility]  Outpatient Rehabilitation &  Therapy  [address]  P: [phone]  F: [phone] [] [facility]  Outpatient Rehabilitation &  Therapy  [address]  P: [phone]  F: [phone]  [] [facility] Outpatient Rehabilitation &  Therapy  [address]  P: [phone]  F: [phone]     Therapy Cancel/No Show note    Date: 2025  Patient: Doris Ceron  : 1985  MRN: [id number]    Cancels/No Shows to date: 10    For today's appointment patient:    [x]  Cancelled    [] Rescheduled appointment    [] No-show     Reason given by patient:    [x]  Patient ill    []  Conflicting appointment    [] No transportation      [] Conflict with work    [] No reason given    [] Weather related    [] COVID-19    [] Other:      Comments:        [x] Next appointment was confirmed    Electronically signed by: Brenden Hagen PTA

## 2025-02-24 ENCOUNTER — HOSPITAL ENCOUNTER (OUTPATIENT)
Dept: PHYSICAL THERAPY | Facility: CLINIC | Age: 40
Setting detail: THERAPIES SERIES
Discharge: HOME | End: 2025-02-24
Payer: MEDICAID

## 2025-02-24 PROCEDURE — 97110 THERAPEUTIC EXERCISES: CPT

## 2025-02-24 NOTE — FLOWSHEET NOTE
[] [facility]  Outpatient Rehabilitation &  Therapy  [address]  [phone]  [phone] [] [facility]  Outpatient Rehabilitation &  Therapy  [address]  P: [phone]  F: [phone] [] [facility]  Outpatient Rehabilitation &  Therapy  [address]  P: [phone]  F: [phone] [x] [facility]  Outpatient Rehabilitation &  Therapy  [address]  [phone]  [phone] [] [facility]  Outpatient Rehabilitation &  Therapy  [address]   P: [phone]  F: [phone]  [] [facility]  Outpatient Rehabilitation &  Therapy  [address]  P: [phone]  F: [phone] [] [facility]  Outpatient Rehabilitation &  Therapy  [address]  P: [phone]  F: [phone] [] [facility]  Outpatient Rehabilitation &  Therapy  [address]  P: [phone]  F: [phone] [] [facility]  Outpatient Rehabilitation &  Therapy  [address]  P: [phone]  F: [phone]  [] [facility] Outpatient Rehabilitation &  Therapy  [address]  P: [phone]  F: [phone]     Physical Therapy Daily Treatment Note    Date:  25   Patient Name:  Doris Ceron    :  1985  MRN: [id number]  Physician: Bakari Smith MD  Insurance:  [facility] MEDICAID per justin yr, no co-pay, no co-insur, no deduct, no hard max, vs  remain, visits not combined, auth req aftr 30 vs   Medical Diagnosis: 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

## 2025-02-28 ENCOUNTER — HOSPITAL ENCOUNTER (OUTPATIENT)
Dept: PHYSICAL THERAPY | Facility: CLINIC | Age: 40
Setting detail: THERAPIES SERIES
End: 2025-02-28
Payer: MEDICAID

## 2025-02-28 NOTE — FLOWSHEET NOTE
[] [facility]  Outpatient Rehabilitation &  Therapy  [address]  [phone]  [phone] [] [facility]  Outpatient Rehabilitation &  Therapy  [address]  P: [phone]  F: [phone] [] [facility]  Outpatient Rehabilitation &  Therapy  [address]  P: [phone]  F: [phone] [x] [facility]  Outpatient Rehabilitation &  Therapy  [address]  [phone]  [phone] [] [facility]  Outpatient Rehabilitation &  Therapy  [address]   P: [phone]  F: [phone]  [] [facility]  Outpatient Rehabilitation &  Therapy  [address]  P: [phone]  F: [phone] [] [facility]  Outpatient Rehabilitation &  Therapy  [address]  P: [phone]  F: [phone] [] [facility]  Outpatient Rehabilitation &  Therapy  [address]  P: [phone]  F: [phone] [] [facility]  Outpatient Rehabilitation &  Therapy  [address]  P: [phone]  F: [phone]  [] [facility] Outpatient Rehabilitation &  Therapy  [address]  P: [phone]  F: [phone]     Therapy Cancel/No Show note    Date: 2025  Patient: Doris Ceron  : 1985  MRN: [id number]    Cancels/No Shows to date: 0    For today's appointment patient:    [x]  Cancelled    [] Rescheduled appointment    [] No-show     Reason given by patient:    [x]  Patient ill    []  Conflicting appointment    [] No transportation      [] Conflict with work    [] No reason given    [] Weather related    [] COVID-19    [x] Other:      Comments:   Patient's daughter is sick and she can not take her to       [x] Next appointment was confirmed    Electronically signed by: Robert Hebert PTA

## 2025-03-03 ENCOUNTER — HOSPITAL ENCOUNTER (OUTPATIENT)
Dept: PHYSICAL THERAPY | Facility: CLINIC | Age: 40
Setting detail: THERAPIES SERIES
Discharge: HOME | End: 2025-03-03
Payer: MEDICAID

## 2025-03-03 PROCEDURE — 97110 THERAPEUTIC EXERCISES: CPT

## 2025-03-03 NOTE — FLOWSHEET NOTE
[] [facility]  Outpatient Rehabilitation &  Therapy  [address]  [phone]  [phone] [] [facility]  Outpatient Rehabilitation &  Therapy  [address]  P: [phone]  F: [phone] [] [facility]  Outpatient Rehabilitation &  Therapy  [address]  P: [phone]  F: [phone] [x] [facility]  Outpatient Rehabilitation &  Therapy  [address]  [phone]  [phone] [] [facility]  Outpatient Rehabilitation &  Therapy  [address]   P: [phone]  F: [phone]  [] [facility]  Outpatient Rehabilitation &  Therapy  [address]  P: [phone]  F: [phone] [] [facility]  Outpatient Rehabilitation &  Therapy  [address]  P: [phone]  F: [phone] [] [facility]  Outpatient Rehabilitation &  Therapy  [address]  P: [phone]  F: [phone] [] [facility]  Outpatient Rehabilitation &  Therapy  [address]  P: [phone]  F: [phone]  [] [facility] Outpatient Rehabilitation &  Therapy  [address]  P: [phone]  F: [phone]     Physical Therapy Daily Treatment Note    Date:  25   Patient Name:  Doris Ceron    :  1985  MRN: [id number]  Physician: Bakari Smith MD  Insurance:  [facility] MEDICAID per justin yr, no co-pay, no co-insur, no deduct, no hard max, vs 30 remain, visits not combined, auth req aftr 30 vs   Medical Diagnosis: 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

## 2025-03-10 ENCOUNTER — HOSPITAL ENCOUNTER (OUTPATIENT)
Dept: PHYSICAL THERAPY | Facility: CLINIC | Age: 40
Setting detail: THERAPIES SERIES
Discharge: HOME | End: 2025-03-10
Payer: MEDICAID

## 2025-03-10 NOTE — FLOWSHEET NOTE
[] [facility]  Outpatient Rehabilitation &  Therapy  [address]  [phone]  [phone] [] [facility]  Outpatient Rehabilitation &  Therapy  [address]  P: [phone]  F: [phone] [] [facility]  Outpatient Rehabilitation &  Therapy  [address]  P: [phone]  F: [phone] [x] [facility]  Outpatient Rehabilitation &  Therapy  [address]  [phone]  [phone] [] [facility]  Outpatient Rehabilitation &  Therapy  [address]   P: [phone]  F: [phone]  [] [facility]  Outpatient Rehabilitation &  Therapy  [address]  P: [phone]  F: [phone] [] [facility]  Outpatient Rehabilitation &  Therapy  [address]  P: [phone]  F: [phone] [] [facility]  Outpatient Rehabilitation &  Therapy  [address]  P: [phone]  F: [phone] [] [facility]  Outpatient Rehabilitation &  Therapy  [address]  P: [phone]  F: [phone]  [] [facility] Outpatient Rehabilitation &  Therapy  [address]  P: [phone]  F: [phone]     Therapy Cancel/No Show note    Date: 3/10/2025  Patient: Doris Ceron  : 1985  MRN: [id number]    Cancels/No Shows to date: 3/0    For today's appointment patient:    [x]  Cancelled    [] Rescheduled appointment    [] No-show     Reason given by patient:    []  Patient ill    []  Conflicting appointment    [] No transportation      [] Conflict with work    [x] No reason given    [] Weather related    [] COVID-19    [x] Other:      Comments:   patient stated that she just wanted to come to next week's appt      [x] Next appointment was confirmed for 3/17    Electronically signed by: Lachelle Floyd PT

## 2025-03-17 ENCOUNTER — HOSPITAL ENCOUNTER (OUTPATIENT)
Dept: PHYSICAL THERAPY | Facility: CLINIC | Age: 40
Setting detail: THERAPIES SERIES
Discharge: HOME | End: 2025-03-17
Payer: MEDICAID

## 2025-03-17 PROCEDURE — 97110 THERAPEUTIC EXERCISES: CPT

## 2025-03-17 NOTE — FLOWSHEET NOTE
- 10 reps  - Standing Shoulder Horizontal Abduction with Anchored Resistance (Mirrored)  - 1 x daily - 7 x weekly - 2 sets - 10 reps  - Standing Single Arm Shoulder External Rotation in Abduction with Anchored Resistance  - 1 x daily - 7 x weekly - 2 sets - 10 reps    Plan: [x] Continue current frequency toward long and short term goals.    [x] Specific Instructions for subsequent treatments: review HEP, co-contraction exercises focusing on improve shoulder stability     Frequency: 1-2 x/week for 10 visits       Time In:1215            Time Out: 1300    Electronically signed by:  Lachelle Floyd, PT

## 2025-03-19 ENCOUNTER — TELEPHONE (OUTPATIENT)
Dept: FAMILY MEDICINE CLINIC | Age: 40
End: 2025-03-19

## 2025-03-19 NOTE — TELEPHONE ENCOUNTER
----- Message from ANGELO Preciado CNP sent at 1/19/2025 11:52 AM EST -----  Please remind the patient to get their fasting blood work completed to recheck red blood cell levels.

## 2025-03-31 ENCOUNTER — HOSPITAL ENCOUNTER (OUTPATIENT)
Dept: PHYSICAL THERAPY | Facility: CLINIC | Age: 40
Setting detail: THERAPIES SERIES
Discharge: HOME | End: 2025-03-31
Payer: MEDICAID

## 2025-03-31 NOTE — FLOWSHEET NOTE
[] [facility]  Outpatient Rehabilitation &  Therapy  [address]  [phone]  [phone] [] [facility]  Outpatient Rehabilitation &  Therapy  [address]  P: [phone]  F: [phone] [] [facility]  Outpatient Rehabilitation &  Therapy  [address]  P: [phone]  F: [phone] [x] [facility]  Outpatient Rehabilitation &  Therapy  [address]  [phone]  [phone] [] [facility]  Outpatient Rehabilitation &  Therapy  [address]   P: [phone]  F: [phone]  [] [facility]  Outpatient Rehabilitation &  Therapy  [address]  P: [phone]  F: [phone] [] [facility]  Outpatient Rehabilitation &  Therapy  [address]  P: [phone]  F: [phone] [] [facility]  Outpatient Rehabilitation &  Therapy  [address]  P: [phone]  F: [phone] [] [facility]  Outpatient Rehabilitation &  Therapy  [address]  P: [phone]  F: [phone]  [] [facility] Outpatient Rehabilitation &  Therapy  [address]  P: [phone]  F: [phone]     Therapy Cancel/No Show note    Date: 3/31/2025  Patient: Doris Ceron  : 1985  MRN: [id number]    Cancels/No Shows to date: 0    For today's appointment patient:    [x]  Cancelled    [] Rescheduled appointment    [] No-show     Reason given by patient:    []  Patient ill    []  Conflicting appointment    [] No transportation      [] Conflict with work    [] No reason given    [] Weather related    [] COVID-19    [x] Other:      Comments: Pt states that she has to  her daughter from school.       [x] Next appointment was confirmed    Electronically signed by: Tye Angeles PT

## 2025-05-23 ENCOUNTER — OFFICE VISIT (OUTPATIENT)
Age: 40
End: 2025-05-23

## 2025-05-23 VITALS
OXYGEN SATURATION: 97 % | TEMPERATURE: 98.5 F | HEART RATE: 76 BPM | HEIGHT: 67 IN | WEIGHT: 165 LBS | RESPIRATION RATE: 16 BRPM | DIASTOLIC BLOOD PRESSURE: 70 MMHG | BODY MASS INDEX: 25.9 KG/M2 | SYSTOLIC BLOOD PRESSURE: 117 MMHG

## 2025-05-23 DIAGNOSIS — J02.9 PHARYNGITIS, UNSPECIFIED ETIOLOGY: Primary | ICD-10-CM

## 2025-05-23 LAB — S PYO AG THROAT QL: NORMAL

## 2025-05-23 RX ORDER — DEXTROMETHORPHAN HYDROBROMIDE, GUAIFENESIN AND PSEUDOEPHEDRINE HYDROCHLORIDE 15; 400; 60 MG/1; MG/1; MG/1
1 TABLET ORAL 2 TIMES DAILY
Qty: 28 TABLET | Refills: 0 | Status: SHIPPED | OUTPATIENT
Start: 2025-05-23 | End: 2025-06-06

## 2025-05-23 RX ORDER — BENZONATATE 200 MG/1
200 CAPSULE ORAL 3 TIMES DAILY PRN
Qty: 30 CAPSULE | Refills: 0 | Status: SHIPPED | OUTPATIENT
Start: 2025-05-23 | End: 2025-06-02

## 2025-05-23 NOTE — PROGRESS NOTES
Doris Ceron (: 1985) is a 39 y.o. female, Established patient, here for evaluation of the following complaint(s): Pharyngitis (x1D)        History provided by:  Patient   used: No    Cold Symptoms          PAST MEDICAL HISTORY    Past Medical History:   Diagnosis Date    Anemia     Umbilical hernia     painful in pregnancy only       SURGICAL HISTORY    Past Surgical History:   Procedure Laterality Date    SHOULDER SURGERY Right 2017    dislocation    WISDOM TOOTH EXTRACTION         CURRENT MEDICATIONS    Current Outpatient Rx   Medication Sig Dispense Refill    Pseudoephedrine-DM-GG (CAPMIST DM) 60- MG TABS Take 1 tablet by mouth in the morning and at bedtime for 14 days 28 tablet 0    benzonatate (TESSALON) 200 MG capsule Take 1 capsule by mouth 3 times daily as needed for Cough 30 capsule 0    vitamin D (ERGOCALCIFEROL) 1.25 MG (55244 UT) CAPS capsule Take 1 capsule by mouth once a week 12 capsule 1       ALLERGIES    No Known Allergies    FAMILY HISTORY    Family History   Problem Relation Age of Onset    Heart Defect Mother         Hx murmur    Obesity Mother         resolved gastric by-pass, mother had cysts under her skin that where removed when over weight this has resolved with wt loss    Kidney stones Father     No Known Problems Sister     No Known Problems Maternal Grandmother     No Known Problems Maternal Grandfather     Breast Cancer Paternal Grandmother 70    Diabetes type 2  Paternal Grandfather 80    No Known Problems Half-Brother     No Known Problems Half-Brother     Nural Tube Defect Half-Sister         Spina bifida: 2 sugeries can walk    No Known Problems Half-Sister        SOCIAL HISTORY    Social History     Socioeconomic History    Marital status:      Spouse name: None    Number of children: None    Years of education: None    Highest education level: None   Tobacco Use    Smoking status: Former     Current packs/day: 0.00     Types: Cigarettes

## 2025-07-17 ENCOUNTER — TELEPHONE (OUTPATIENT)
Dept: FAMILY MEDICINE CLINIC | Age: 40
End: 2025-07-17

## 2025-07-17 ENCOUNTER — OFFICE VISIT (OUTPATIENT)
Dept: FAMILY MEDICINE CLINIC | Age: 40
End: 2025-07-17
Payer: MEDICAID

## 2025-07-17 VITALS
HEART RATE: 107 BPM | BODY MASS INDEX: 27.62 KG/M2 | HEIGHT: 67 IN | SYSTOLIC BLOOD PRESSURE: 120 MMHG | OXYGEN SATURATION: 99 % | DIASTOLIC BLOOD PRESSURE: 70 MMHG | WEIGHT: 176 LBS | TEMPERATURE: 97 F

## 2025-07-17 DIAGNOSIS — L30.9 DERMATITIS: Primary | ICD-10-CM

## 2025-07-17 DIAGNOSIS — B37.9 YEAST INFECTION: ICD-10-CM

## 2025-07-17 PROCEDURE — 99214 OFFICE O/P EST MOD 30 MIN: CPT

## 2025-07-17 RX ORDER — FLUCONAZOLE 150 MG/1
150 TABLET ORAL
Qty: 2 TABLET | Refills: 0 | Status: SHIPPED | OUTPATIENT
Start: 2025-07-17 | End: 2025-07-23

## 2025-07-17 RX ORDER — DOXYCYCLINE HYCLATE 100 MG
100 TABLET ORAL 2 TIMES DAILY
Qty: 14 TABLET | Refills: 0 | Status: SHIPPED | OUTPATIENT
Start: 2025-07-17 | End: 2025-07-24

## 2025-07-17 RX ORDER — PREDNISONE 10 MG/1
TABLET ORAL
Qty: 49 TABLET | Refills: 0 | Status: SHIPPED | OUTPATIENT
Start: 2025-07-17 | End: 2025-08-07

## 2025-07-17 SDOH — ECONOMIC STABILITY: FOOD INSECURITY: WITHIN THE PAST 12 MONTHS, YOU WORRIED THAT YOUR FOOD WOULD RUN OUT BEFORE YOU GOT MONEY TO BUY MORE.: NEVER TRUE

## 2025-07-17 SDOH — ECONOMIC STABILITY: FOOD INSECURITY: WITHIN THE PAST 12 MONTHS, THE FOOD YOU BOUGHT JUST DIDN'T LAST AND YOU DIDN'T HAVE MONEY TO GET MORE.: NEVER TRUE

## 2025-07-17 ASSESSMENT — PATIENT HEALTH QUESTIONNAIRE - PHQ9
SUM OF ALL RESPONSES TO PHQ QUESTIONS 1-9: 0
1. LITTLE INTEREST OR PLEASURE IN DOING THINGS: NOT AT ALL
SUM OF ALL RESPONSES TO PHQ QUESTIONS 1-9: 0
SUM OF ALL RESPONSES TO PHQ QUESTIONS 1-9: 0
2. FEELING DOWN, DEPRESSED OR HOPELESS: NOT AT ALL
SUM OF ALL RESPONSES TO PHQ QUESTIONS 1-9: 0

## 2025-07-17 NOTE — PROGRESS NOTES
[facility]   [address]  [id number]  7/24/2025    Doris Ceron is a 39 y.o. female who presents today for her medical conditions and/or complaints as noted below.    Doris Ceron is scheduled today for Rash  .      HPI:     History of Present Illness  The patient presents for evaluation of a rash.    The rash began on 06/29/2025 during a road trip to [place] after slapping a small bug off their arm. Approximately 5 minutes later, they experienced itching, redness, and swelling in their arm. The following day, they developed a blister and slight eye swelling. They received a steroid injection and 2.5% hydrocortisone cream in the ER. Despite treatment, the rash worsened, leading to 4 more ER visits and a dermatology referral. The rash is hot, painful, and rough. They were prescribed Keflex, then advised to discontinue it and start a 5-day course of steroids (10 mg). Due to worsening symptoms, they resumed the antibiotic as the rash appeared to be developing into cellulitis. They completed the antibiotics and oral steroids on 07/14/2025. They are taking probiotics for a yeast infection caused by antibiotics. No exposure to poison ivy or wooded areas, except for the [facility] [place], but itching started before this visit. Antihistamines have been ineffective. Covering the rash with bandages exacerbates the condition. The rash is sensitive, causing a burning sensation on contact. Itching is intermittent, sometimes severe enough to wake them. They applied hydrocortisone cream and took a 24-hour allergy pill.    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  Previous notes reviewed in the chart.      Vitals:    07/17/25 1343   BP: 120/70   Pulse: (!) 107   Temp: 97 °F (36.1 °C)   SpO2: 99%   Weight: 79.8 kg (176 lb)   Height: 1.702 m (5' 7.01\")      Past

## 2025-07-18 ENCOUNTER — PATIENT MESSAGE (OUTPATIENT)
Dept: FAMILY MEDICINE CLINIC | Age: 40
End: 2025-07-18

## 2025-07-23 NOTE — PROGRESS NOTES
Dermatology Patient Note  [facility]  [facility] DERMATOLOGY  [address]  [place] [address]  Dept: [phone]  Dept Fax: [phone]      VISITDATE: 2025   REFERRING PROVIDER: Nasir Castillo APRN - C*      Doris Ceron is a 39 y.o. female  who presents today in the office for:    New Patient (Patient presents in the office today as a new patient for a rash on her right forearm and right abdomen. This started on  of this year. This does itch but is not painful. This started as blisters and was painful but has started to scab over and dry up. She used hydrocortisone cream and was given prednisone and doxycycline for this. This has helped dry it up. )      HISTORY OF PRESENT ILLNESS:  As above. New patient presents to [facility] care. The rash first presented 2025 on her upper extremities. It started as small blisters and has radiated to her stomach and lower extremities. It scabbed over and dried. She notes having a poison ivy plant near her front door so she has had extensive, repeated exposure to it every time she reached for her mailbox, and didn't realize it until a few days ago. She initially was prescribed hydrocortisone, doxycycline, and prednisone 20 mg daily with minimal benefit. She has now been on prednisone 40 mg daily x 7 days and improving.She reports the poison ivy plant has been taken care of as of 4 days ago, she is unsure of her last exposure to it.   MEDICAL PROBLEMS:  Patient Active Problem List    Diagnosis Date Noted    Umbilical hernia 2022     Priority: Medium    Pharyngitis 2025     25 female apg 8/9 [id number] 2023    FHx VTE 2022     DVT - mother   MFM ordered thrombophilia w/u      Family history of spina bifida 2022     Half sister- had surgery at birth and at 7 yrs old, is ambulatory and has children   AFP negative          CURRENT MEDICATIONS:   Current Outpatient Medications

## 2025-07-24 ENCOUNTER — OFFICE VISIT (OUTPATIENT)
Age: 40
End: 2025-07-24
Payer: MEDICAID

## 2025-07-24 VITALS
TEMPERATURE: 98.5 F | WEIGHT: 174 LBS | HEIGHT: 68 IN | BODY MASS INDEX: 26.37 KG/M2 | OXYGEN SATURATION: 99 % | SYSTOLIC BLOOD PRESSURE: 128 MMHG | HEART RATE: 55 BPM | DIASTOLIC BLOOD PRESSURE: 74 MMHG

## 2025-07-24 DIAGNOSIS — L23.7 POISON IVY: Primary | ICD-10-CM

## 2025-07-24 DIAGNOSIS — L81.0 POST-INFLAMMATORY HYPERPIGMENTATION: ICD-10-CM

## 2025-07-24 PROCEDURE — 99203 OFFICE O/P NEW LOW 30 MIN: CPT | Performed by: DERMATOLOGY

## 2025-07-24 PROCEDURE — 99202 OFFICE O/P NEW SF 15 MIN: CPT | Performed by: DERMATOLOGY

## 2025-07-24 RX ORDER — CLOBETASOL PROPIONATE 0.5 MG/G
OINTMENT TOPICAL
Qty: 60 G | Refills: 2 | Status: SHIPPED | OUTPATIENT
Start: 2025-07-24

## 2025-07-24 RX ORDER — LORATADINE 10 MG/1
10 TABLET ORAL DAILY
COMMUNITY

## 2025-07-24 ASSESSMENT — ENCOUNTER SYMPTOMS
DIARRHEA: 0
SHORTNESS OF BREATH: 0
ABDOMINAL PAIN: 0
SINUS PAIN: 0
COUGH: 0
EYE ITCHING: 0
CHEST TIGHTNESS: 0
TROUBLE SWALLOWING: 0
SINUS PRESSURE: 0
SORE THROAT: 0
EYE DISCHARGE: 0
EYE REDNESS: 0
WHEEZING: 0
NAUSEA: 0
VOMITING: 0

## 2025-07-24 NOTE — PATIENT INSTRUCTIONS
Poison ivy   - start clobetasol 0.05% ointment twice daily for two weeks, then twice weekly until resolved  - continue course of prednisone   - may apply Vaseline as needed